# Patient Record
Sex: MALE | Race: WHITE | NOT HISPANIC OR LATINO | Employment: OTHER | URBAN - METROPOLITAN AREA
[De-identification: names, ages, dates, MRNs, and addresses within clinical notes are randomized per-mention and may not be internally consistent; named-entity substitution may affect disease eponyms.]

---

## 2017-02-16 ENCOUNTER — OFFICE VISIT (OUTPATIENT)
Dept: FAMILY MEDICINE | Facility: CLINIC | Age: 62
End: 2017-02-16
Payer: COMMERCIAL

## 2017-02-16 VITALS
SYSTOLIC BLOOD PRESSURE: 128 MMHG | BODY MASS INDEX: 34.4 KG/M2 | TEMPERATURE: 98.3 F | RESPIRATION RATE: 16 BRPM | HEIGHT: 68 IN | OXYGEN SATURATION: 97 % | WEIGHT: 227 LBS | HEART RATE: 61 BPM | DIASTOLIC BLOOD PRESSURE: 80 MMHG

## 2017-02-16 DIAGNOSIS — Z23 NEED FOR PROPHYLACTIC VACCINATION AND INOCULATION AGAINST INFLUENZA: ICD-10-CM

## 2017-02-16 DIAGNOSIS — E78.5 HYPERLIPIDEMIA LDL GOAL <130: ICD-10-CM

## 2017-02-16 DIAGNOSIS — R73.09 ELEVATED GLUCOSE: ICD-10-CM

## 2017-02-16 DIAGNOSIS — Z11.59 NEED FOR HEPATITIS C SCREENING TEST: ICD-10-CM

## 2017-02-16 DIAGNOSIS — Z00.00 ROUTINE GENERAL MEDICAL EXAMINATION AT A HEALTH CARE FACILITY: Primary | ICD-10-CM

## 2017-02-16 DIAGNOSIS — I10 HYPERTENSION GOAL BP (BLOOD PRESSURE) < 140/90: ICD-10-CM

## 2017-02-16 DIAGNOSIS — E03.9 ACQUIRED HYPOTHYROIDISM: ICD-10-CM

## 2017-02-16 DIAGNOSIS — E66.01 MORBID OBESITY DUE TO EXCESS CALORIES (H): ICD-10-CM

## 2017-02-16 LAB
HBA1C MFR BLD: 5 % (ref 4.3–6)
HGB BLD-MCNC: 15.1 G/DL (ref 13.3–17.7)

## 2017-02-16 PROCEDURE — 83036 HEMOGLOBIN GLYCOSYLATED A1C: CPT | Performed by: INTERNAL MEDICINE

## 2017-02-16 PROCEDURE — 80048 BASIC METABOLIC PNL TOTAL CA: CPT | Performed by: INTERNAL MEDICINE

## 2017-02-16 PROCEDURE — 86803 HEPATITIS C AB TEST: CPT | Performed by: INTERNAL MEDICINE

## 2017-02-16 PROCEDURE — 99396 PREV VISIT EST AGE 40-64: CPT | Performed by: INTERNAL MEDICINE

## 2017-02-16 PROCEDURE — 80061 LIPID PANEL: CPT | Performed by: INTERNAL MEDICINE

## 2017-02-16 PROCEDURE — 84443 ASSAY THYROID STIM HORMONE: CPT | Performed by: INTERNAL MEDICINE

## 2017-02-16 PROCEDURE — 85018 HEMOGLOBIN: CPT | Performed by: INTERNAL MEDICINE

## 2017-02-16 PROCEDURE — 36415 COLL VENOUS BLD VENIPUNCTURE: CPT | Performed by: INTERNAL MEDICINE

## 2017-02-16 RX ORDER — METOPROLOL TARTRATE 100 MG
TABLET ORAL
Qty: 90 TABLET | Refills: 3 | Status: SHIPPED | OUTPATIENT
Start: 2017-02-16 | End: 2018-02-23

## 2017-02-16 RX ORDER — LEVOTHYROXINE SODIUM 75 UG/1
75 TABLET ORAL DAILY
Qty: 90 TABLET | Refills: 3 | Status: SHIPPED | OUTPATIENT
Start: 2017-02-16 | End: 2018-02-23

## 2017-02-16 RX ORDER — PRAVASTATIN SODIUM 10 MG
TABLET ORAL
Qty: 48 TABLET | Refills: 3 | Status: SHIPPED | OUTPATIENT
Start: 2017-02-16 | End: 2017-02-27

## 2017-02-16 ASSESSMENT — PAIN SCALES - GENERAL: PAINLEVEL: NO PAIN (0)

## 2017-02-16 NOTE — LETTER
"                                                         [facility]  [address] [place] [address]. [place] [address]    March 2, 2017    Sonny Armstrong  [address] [facility] [place] [address]          Dear Sonny,  We have been trying to reach you and have been unsuccessful.  Indeed, Mr. Sonny Armstrong has a truly elevated low density lipoprotein, \" the bad cholesterol \" way out of the ideal range and I agree with an increased Statins (or HMG-CoA reductase inhibitor) dosing.    Lets increase dose to 20 milligrams 4 days per week. Recheck at some point down the road , 6 months is recommended .    He can double up on the current 10 milligram tabs and then when we refill his  Medication it can be with the new 20 milligram dose.    Results for orders placed or performed in visit on 02/16/17   Hepatitis C Screen Reflex to HCV RNA Quant and Genotype   Result Value Ref Range    Hepatitis C Antibody  NR     Nonreactive   Assay performance characteristics have not been established for newborns,   infants, and children     Lipid panel reflex to direct LDL   Result Value Ref Range    Cholesterol 271 (H) <200 mg/dL    Triglycerides 109 <150 mg/dL    HDL Cholesterol 59 >39 mg/dL    LDL Cholesterol Calculated 190 (H) <100 mg/dL    Non HDL Cholesterol 212 (H) <130 mg/dL   Hemoglobin A1c   Result Value Ref Range    Hemoglobin A1C 5.0 4.3 - 6.0 %   Hemoglobin   Result Value Ref Range    Hemoglobin 15.1 13.3 - 17.7 g/dL   TSH with free T4 reflex   Result Value Ref Range    TSH 3.53 0.40 - 4.00 mU/L   Basic metabolic panel   Result Value Ref Range    Sodium 142 133 - 144 mmol/L    Potassium 4.5 3.4 - 5.3 mmol/L    Chloride 106 94 - 109 mmol/L    Carbon Dioxide 28 20 - 32 mmol/L    Anion Gap 8 3 - 14 mmol/L    Glucose 90 70 - 99 mg/dL    Urea Nitrogen 25 7 - 30 mg/dL    Creatinine 0.92 0.66 - 1.25 mg/dL    GFR Estimate 84 >60 mL/min/1.7m2    GFR Estimate If Black >90   GFR Calc   >60 " mL/min/1.7m2    Calcium 9.0 8.5 - 10.1 mg/dL       If you have any questions or concerns, please me or my clinic team at [phone].      Sincerely,        Moreno Padilla MD/bt

## 2017-02-16 NOTE — PATIENT INSTRUCTIONS
Preventive Health Recommendations  Male Ages 50   64    Yearly exam:             See your health care provider every year in order to  o   Review health changes.   o   Discuss preventive care.    o   Review your medicines if your doctor has prescribed any.     Have a cholesterol test every 5 years, or more frequently if you are at risk for high cholesterol/heart disease.     Have a diabetes test (fasting glucose) every three years. If you are at risk for diabetes, you should have this test more often.     Have a colonoscopy at age 50, or have a yearly FIT test (stool test). These exams will check for colon cancer.      Talk with your health care provider about whether or not a prostate cancer screening test (PSA) is right for you.    You should be tested each year for STDs (sexually transmitted diseases), if you re at risk.     Shots: Get a flu shot each year. Get a tetanus shot every 10 years.     Nutrition:    Eat at least 5 servings of fruits and vegetables daily.     Eat whole-grain bread, whole-wheat pasta and brown rice instead of white grains and rice.     Talk to your provider about Calcium and Vitamin D.     Lifestyle    Exercise for at least 150 minutes a week (30 minutes a day, 5 days a week). This will help you control your weight and prevent disease.     Limit alcohol to one drink per day.     No smoking.     Wear sunscreen to prevent skin cancer.     See your dentist every six months for an exam and cleaning.     See your eye doctor every 1 to 2 years.  Recommended to have colonoscopy by around November / October 2017. Please let me know when to schedule you, please call the office then.

## 2017-02-16 NOTE — MR AVS SNAPSHOT
After Visit Summary   2/16/2017    Sonny Armstrong    MRN: [id number]           Patient Information     Date Of Birth          1955        Visit Information        Provider Department      2/16/2017 4:30 PM Moreno Padilla MD [facility]        Today's Diagnoses     Routine general medical examination at a health care facility    -  1    Hypertension goal BP (blood pressure) < 140/90        Hyperlipidemia LDL goal <130        Need for hepatitis C screening test        Need for prophylactic vaccination and inoculation against influenza        Elevated glucose        Acquired hypothyroidism        Morbid obesity due to excess calories (H)          Care Instructions      Preventive Health Recommendations  Male Ages 50 - 64    Yearly exam:             See your health care provider every year in order to  o   Review health changes.   o   Discuss preventive care.    o   Review your medicines if your doctor has prescribed any.     Have a cholesterol test every 5 years, or more frequently if you are at risk for high cholesterol/heart disease.     Have a diabetes test (fasting glucose) every three years. If you are at risk for diabetes, you should have this test more often.     Have a colonoscopy at age 50, or have a yearly FIT test (stool test). These exams will check for colon cancer.      Talk with your health care provider about whether or not a prostate cancer screening test (PSA) is right for you.    You should be tested each year for STDs (sexually transmitted diseases), if you re at risk.     Shots: Get a flu shot each year. Get a tetanus shot every 10 years.     Nutrition:    Eat at least 5 servings of fruits and vegetables daily.     Eat whole-grain bread, whole-wheat pasta and brown rice instead of white grains and rice.     Talk to your provider about Calcium and Vitamin D.     Lifestyle    Exercise for at least 150 minutes a week (30 minutes a day, 5 days a week). This will help  "you control your weight and prevent disease.     Limit alcohol to one drink per day.     No smoking.     Wear sunscreen to prevent skin cancer.     See your dentist every six months for an exam and cleaning.     See your eye doctor every 1 to 2 years.  Recommended to have colonoscopy by around November / October 2017. Please let me know when to schedule you, please call the office then.        Follow-ups after your visit        Who to contact     If you have questions or need follow up information about today's clinic visit or your schedule please contact [facility] directly at [phone].  Normal or non-critical lab and imaging results will be communicated to you by VersionOnehart, letter or phone within 4 business days after the clinic has received the results. If you do not hear from us within 7 days, please contact the clinic through [employer] or phone. If you have a critical or abnormal lab result, we will notify you by phone as soon as possible.  Submit refill requests through IntelePeer or call your pharmacy and they will forward the refill request to us. Please allow 3 business days for your refill to be completed.          Additional Information About Your Visit        MyChart Information     IntelePeer gives you secure access to your electronic health record. If you see a primary care provider, you can also send messages to your care team and make appointments. If you have questions, please call your primary care clinic.  If you do not have a primary care provider, please call [phone] and they will assist you.        Care EveryWhere ID     This is your Care EveryWhere ID. This could be used by other organizations to access your [facility] medical records  [id number]        Your Vitals Were     Pulse Temperature Respirations Height Pulse Oximetry BMI (Body Mass Index)    61 98.3  F (36.8  C) (Oral) 16 5' 8\" (1.727 m) 97% 34.52 kg/m2       Blood Pressure from Last 3 Encounters:   02/16/17 128/80 "   08/04/15 132/85   08/21/14 130/80    Weight from Last 3 Encounters:   02/16/17 227 lb (103 kg)   08/04/15 220 lb (99.8 kg)   08/21/14 205 lb (93 kg)              We Performed the Following     Basic metabolic panel     Hemoglobin A1c     Hemoglobin     Hepatitis C Screen Reflex to HCV RNA Quant and Genotype     Lipid panel reflex to direct LDL     TSH with free T4 reflex          Today's Medication Changes          These changes are accurate as of: 2/16/17 10:51 PM.  If you have any questions, ask your nurse or doctor.               These medicines have changed or have updated prescriptions.        Dose/Directions    levothyroxine 75 MCG tablet   Commonly known as:  SYNTHROID/LEVOTHROID   This may have changed:  See the new instructions.   Used for:  Acquired hypothyroidism   Changed by:  Moreno Padilla MD        Dose:  75 mcg   Take 1 tablet (75 mcg) by mouth daily   Quantity:  90 tablet   Refills:  3       metoprolol 100 MG tablet   Commonly known as:  LOPRESSOR   This may have changed:  See the new instructions.   Used for:  Hypertension goal BP (blood pressure) < 140/90   Changed by:  Moreno Padilla MD        TAKE ONE-HALF BY MOUTH TWICE DAILY   Quantity:  90 tablet   Refills:  3         Stop taking these medicines if you haven't already. Please contact your care team if you have questions.     STATIN NOT PRESCRIBED (INTENTIONAL)   Stopped by:  Moreno Padilla MD                Where to get your medicines      These medications were sent to [facility] [address] [address] AT [facility] [address] [facility] [address]     Phone:  [phone]     levothyroxine 75 MCG tablet    metoprolol 100 MG tablet    pravastatin 10 MG tablet                Primary Care Provider Office Phone # Fax #    Moreno Padilla [phone]       [facility] [address] [facility] [address]        Thank you!     Thank you for choosing [facility]  for your care. Our goal is always to provide you with excellent care. Hearing back from our patients is one way we can continue to improve our services. Please take a few minutes to complete the written survey that you may receive in the mail after your visit with us. Thank you!             Your Updated Medication List - Protect others around you: Learn how to safely use, store and throw away your medicines at www.disposemymeds.org.          This list is accurate as of: 2/16/17 10:51 PM.  Always use your most recent med list.                   Brand Name Dispense Instructions for use    aspirin 81 MG tablet      Take 1 tablet by mouth daily.       fish oil-omega-3 fatty acids 1000 MG capsule      Take 3 capsules by mouth daily.       levothyroxine 75 MCG tablet    SYNTHROID/LEVOTHROID    90 tablet    Take 1 tablet (75 mcg) by mouth daily       metoprolol 100 MG tablet    LOPRESSOR    90 tablet    TAKE ONE-HALF BY MOUTH TWICE DAILY       olopatadine 0.1 % ophthalmic solution    PATANOL    5 mL    Place 1 drop into both eyes 2 times daily       pravastatin 10 MG tablet    PRAVACHOL    48 tablet    Take 1 tab (10mg) 4 days per week

## 2017-02-16 NOTE — NURSING NOTE
"Chief Complaint   Patient presents with     Physical       Initial /80 (BP Location: Left arm, Patient Position: Chair, Cuff Size: Adult Regular)  Pulse 61  Temp 98.3  F (36.8  C) (Oral)  Resp 16  Ht 5' 8\" (1.727 m)  Wt 227 lb (103 kg)  SpO2 97%  BMI 34.52 kg/m2 Estimated body mass index is 34.52 kg/(m^2) as calculated from the following:    Height as of this encounter: 5' 8\" (1.727 m).    Weight as of this encounter: 227 lb (103 kg).  Medication Reconciliation: complete   Karie Avila CMA      "

## 2017-02-16 NOTE — PROGRESS NOTES
SUBJECTIVE:     CC: Sonny Armstrong is an 61 year old male who presents for preventative health visit.        Routine general medical examination at a health care facility  Hypertension goal BP (blood pressure) < 140/90  Hyperlipidemia LDL goal <130  Need for hepatitis C screening test  Need for prophylactic vaccination and inoculation against influenza  Elevated glucose  Acquired hypothyroidism  Morbid obesity due to excess calories (H)     Healthy Habits:    Do you get at least three servings of calcium containing foods daily (dairy, green leafy vegetables, etc.)? yes    Amount of exercise or daily activities, outside of work: No; will start again soon.    Problems taking medications regularly No    Medication side effects: No    Have you had an eye exam in the past two years? yes    Do you see a dentist twice per year? yes    Do you have sleep apnea, excessive snoring or daytime drowsiness? no    He ran out of the medication for hypercholesterolemia 1 month ago, otherwise doing ok. So he wants me to accept his fasting lipid panel numbers whatever they are, he agrees to get back on treatment with his statin medication   Wt Readings from Last 5 Encounters:   02/16/17 227 lb (103 kg)   08/04/15 220 lb (99.8 kg)   08/21/14 205 lb (93 kg)   11/14/13 212 lb 6.4 oz (96.3 kg)   08/19/13 215 lb 12.8 oz (97.9 kg)     Body mass index is 34.52 kg/(m^2).      Today's PHQ-2 Score:   PHQ-2 ( 1999 Pfizer) 2/16/2017 8/4/2015   Q1: Little interest or pleasure in doing things 0 0   Q2: Feeling down, depressed or hopeless 0 0   PHQ-2 Score 0 0       Abuse: Current or Past(Physical, Sexual or Emotional)- No  Do you feel safe in your environment - Yes    Social History   Substance Use Topics     Smoking status: Never Smoker     Smokeless tobacco: Never Used     Alcohol use No     The patient does not drink >3 drinks per day nor >7 drinks per week.    Last PSA:   PSA   Date Value Ref Range Status   06/06/2012 0.88 0 - 4 ug/L Final        Recent Labs   Lab Test  08/04/15   1502  08/21/14   1457   CHOL  216*  222*   HDL  50  61   LDL  141*  140*   TRIG  125  103   CHOLHDLRATIO  4.3  3.6     BP Readings from Last 3 Encounters:   02/16/17 128/80   08/04/15 132/85   08/21/14 130/80         Reviewed orders with patient. Reviewed health maintenance and updated orders accordingly - Yes    All Histories reviewed and updated in Epic.      ROS:  C: NEGATIVE for fever, chills, change in weight  I: NEGATIVE for worrisome rashes, moles or lesions  E: NEGATIVE for vision changes or irritation  ENT: NEGATIVE for ear, mouth and throat problems  R: NEGATIVE for significant cough or SOB  CV: NEGATIVE for chest pain, palpitations or peripheral edema  GI: NEGATIVE for nausea, abdominal pain, heartburn, or change in bowel habits   male: negative for dysuria, hematuria, decreased urinary stream, erectile dysfunction, urethral discharge  M: NEGATIVE for significant arthralgias or myalgia  N: NEGATIVE for weakness, dizziness or paresthesias  P: NEGATIVE for changes in mood or affect    Problem list, Medication list, Allergies, and Medical/Social/Surgical histories reviewed in EPIC and updated as appropriate.  Labs reviewed in EPIC  BP Readings from Last 3 Encounters:   02/16/17 128/80   08/04/15 132/85   08/21/14 130/80    Wt Readings from Last 3 Encounters:   02/16/17 227 lb (103 kg)   08/04/15 220 lb (99.8 kg)   08/21/14 205 lb (93 kg)                  Patient Active Problem List   Diagnosis     Hyperlipidemia LDL goal <130     Hypothyroidism     Hypertension goal BP (blood pressure) < 140/90     BURDICK (nonalcoholic steatohepatitis)     Obesity     Elevated glucose     Chronic fatigue     Advanced directives, counseling/discussion     Hx of LASIK, ou     Anterior corneal dystrophy, ou     Past Surgical History   Procedure Laterality Date     Tonsillectomy  1971       Social History   Substance Use Topics     Smoking status: Never Smoker     Smokeless tobacco: Never  "Used     Alcohol use No     Family History   Problem Relation Age of Onset     HEART DISEASE Father      DIABETES Maternal Grandfather      Thyroid Disease Daughter          Current Outpatient Prescriptions   Medication Sig Dispense Refill     metoprolol (LOPRESSOR) 100 MG tablet TAKE ONE-HALF BY MOUTH TWICE DAILY 90 tablet 0     levothyroxine (SYNTHROID, LEVOTHROID) 75 MCG tablet TAKE 1 TABLET BY MOUTH DAILY 90 tablet 0     pravastatin (PRAVACHOL) 10 MG tablet Take 1 tab (10mg) 4 days per week 48 tablet 3     olopatadine (PATANOL) 0.1 % ophthalmic solution Place 1 drop into both eyes 2 times daily 5 mL 1     aspirin 81 MG tablet Take 1 tablet by mouth daily.       fish oil-omega-3 fatty acids (FISH OIL) 1000 MG capsule Take 3 capsules by mouth daily.       No Known Allergies  Recent Labs   Lab Test  08/04/15   1502  08/21/14   1457  08/19/13   1505   A1C  5.4  4.9  5.0   LDL  141*  140*  175*   HDL  50  61  49   TRIG  125  103  113   ALT  48  45  51   CR  1.00  1.03  0.71   GFRESTIMATED  77  74  >90   GFRESTBLACK  >90   GFR Calc    90  >90   POTASSIUM  4.1  4.4  4.5   TSH  1.54  2.94  1.28      OBJECTIVE:     /80 (BP Location: Left arm, Patient Position: Chair, Cuff Size: Adult Regular)  Pulse 61  Temp 98.3  F (36.8  C) (Oral)  Resp 16  Ht 5' 8\" (1.727 m)  Wt 227 lb (103 kg)  SpO2 97%  BMI 34.52 kg/m2  EXAM:  GENERAL: healthy, alert and no distress, answers all questions appropriately, talkative and appropriate, normal grooming and affect , appears his stated age   EYES: Eyes grossly normal to inspection, PERRL and conjunctivae and sclerae normal  HENT: ear canals and TM's normal, nose and mouth without ulcers or lesions  NECK: no adenopathy, no asymmetry, masses, or scars and thyroid normal to palpation  RESP: lungs clear to auscultation - no rales, rhonchi or wheezes  CV: regular rate and rhythm, normal S1 S2, no S3 or S4, no murmur, click or rub, no peripheral edema and peripheral pulses " strong  ABDOMEN: soft, nontender, no hepatosplenomegaly, no masses and bowel sounds normal  MS: no gross musculoskeletal defects noted, no edema  SKIN: no suspicious lesions or rashes  NEURO: Normal strength and tone, mentation intact and speech normal  PSYCH: mentation appears normal, affect normal/bright    ASSESSMENT/PLAN:     (R73.09) Elevated glucose  (primary encounter diagnosis)  Comment: this patient has a a nonspecific trivial variation from the normal range with his blood glucose and this is a non-issue at this point , recheck in a year   Lab Results   Component Value Date    A1C 5.0 02/16/2017    A1C 5.4 08/04/2015    A1C 4.9 08/21/2014    A1C 5.0 08/19/2013    A1C 5.3 08/15/2012       Plan: as above     (E03.9) Hypothyroidism  Comment:   TSH   Date Value Ref Range Status   08/04/2015 1.54 0.40 - 4.00 mU/L Final   Plan: levothyroxine (SYNTHROID/LEVOTHROID) 75 MCG         tablet            (I10) Hypertension goal BP (blood pressure) < 140/90  Comment: controlled within acceptable limits , continue current plan of care  , check basic metabolic panel   Plan: metoprolol (LOPRESSOR) 100 MG tablet            (E78.5) Hyperlipidemia LDL goal <130  Comment: current fasting lipid panel numbers are still pending  Lab Results   Component Value Date    CHOL 216 08/04/2015     Lab Results   Component Value Date    HDL 50 08/04/2015     Lab Results   Component Value Date     08/04/2015     04/02/2010     Lab Results   Component Value Date    TRIG 125 08/04/2015     Lab Results   Component Value Date    CHOLHDLRATIO 4.3 08/04/2015       Plan: pravastatin (PRAVACHOL) 10 MG tablet            (Z11.59) Need for hepatitis C screening test  Comment: tested today   Plan: hepatitis C     (Z23) Need for prophylactic vaccination and inoculation against influenza  Comment: declines   Plan: see immunization section of Epic electronic medical records       COUNSELING:  Reviewed preventive health counseling, as reflected  "in patient instructions       Regular exercise       Healthy diet/nutrition       Vision screening       Hearing screening       Colon cancer screening       Prostate cancer screening       Advance Care Planning         reports that he has never smoked. He has never used smokeless tobacco.    Estimated body mass index is 34.52 kg/(m^2) as calculated from the following:    Height as of this encounter: 5' 8\" (1.727 m).    Weight as of this encounter: 227 lb (103 kg).   Weight management plan: Discussed healthy diet and exercise guidelines and patient will follow up in 12 months in clinic to re-evaluate.    Counseling Resources:  ATP IV Guidelines  Pooled Cohorts Equation Calculator  FRAX Risk Assessment  ICSI Preventive Guidelines  Dietary Guidelines for Americans, 2010  USDA's MyPlate  ASA Prophylaxis  Lung CA Screening    Moreno Padilla MD  [facility]  "

## 2017-02-17 LAB
ANION GAP SERPL CALCULATED.3IONS-SCNC: 8 MMOL/L (ref 3–14)
BUN SERPL-MCNC: 25 MG/DL (ref 7–30)
CALCIUM SERPL-MCNC: 9 MG/DL (ref 8.5–10.1)
CHLORIDE SERPL-SCNC: 106 MMOL/L (ref 94–109)
CO2 SERPL-SCNC: 28 MMOL/L (ref 20–32)
CREAT SERPL-MCNC: 0.92 MG/DL (ref 0.66–1.25)
GFR SERPL CREATININE-BSD FRML MDRD: 84 ML/MIN/1.7M2
GLUCOSE SERPL-MCNC: 90 MG/DL (ref 70–99)
HCV AB SERPL QL IA: NORMAL
POTASSIUM SERPL-SCNC: 4.5 MMOL/L (ref 3.4–5.3)
SODIUM SERPL-SCNC: 142 MMOL/L (ref 133–144)

## 2017-02-19 NOTE — PROGRESS NOTES
Dear Sonny,    Your recent test results are attached.      Normal kidney function and electrolytes.      If you have any questions please feel free to contact [phone] or myself via PitchEnginet.    Sincerely,  Osiris Bryant CNP ( Moreno Padilla MD is currently out of office )

## 2017-02-20 LAB
CHOLEST SERPL-MCNC: 271 MG/DL
HDLC SERPL-MCNC: 59 MG/DL
LDLC SERPL CALC-MCNC: 190 MG/DL
NONHDLC SERPL-MCNC: 212 MG/DL
TRIGL SERPL-MCNC: 109 MG/DL
TSH SERPL DL<=0.005 MIU/L-ACNC: 3.53 MU/L (ref 0.4–4)

## 2017-02-27 DIAGNOSIS — E78.5 HYPERLIPIDEMIA LDL GOAL <130: ICD-10-CM

## 2017-02-27 RX ORDER — PRAVASTATIN SODIUM 20 MG
TABLET ORAL
Qty: 48 TABLET | Refills: 1 | Status: SHIPPED | OUTPATIENT
Start: 2017-02-27 | End: 2018-02-23

## 2017-02-28 RX ORDER — PRAVASTATIN SODIUM 20 MG
TABLET ORAL
Qty: 51 TABLET | Refills: 1 | OUTPATIENT
Start: 2017-02-28

## 2017-02-28 NOTE — TELEPHONE ENCOUNTER
Duplicate. Called and verified with pharmacy.  Medication Detail         Disp Refills Start End MERRY     pravastatin (PRAVACHOL) 20 MG tablet 48 tablet 1 2/27/2017  No     Sig: Take 1 tab (20mg) 4 days per week     Class: E-Prescribe     Notes to Pharmacy: Can double up on 10mg tablets until supply is gone, then start 20mg tabs. Patient will call when needed     Order: [id number]     E-Prescribing Status: Receipt confirmed by pharmacy (2/27/2017  6:58 PM CST)       Printout Tracking      External Result Report       Medication Administration Instructions      Take 1 tab (20mg) 4 days per week       Pharmacy      [facility] [id number] [facility] [address] AT [facility] [address]         An NATALIE Hopson

## 2017-12-03 ENCOUNTER — HEALTH MAINTENANCE LETTER (OUTPATIENT)
Age: 62
End: 2017-12-03

## 2018-02-23 ENCOUNTER — RADIANT APPOINTMENT (OUTPATIENT)
Dept: GENERAL RADIOLOGY | Facility: CLINIC | Age: 63
End: 2018-02-23
Attending: INTERNAL MEDICINE
Payer: COMMERCIAL

## 2018-02-23 ENCOUNTER — OFFICE VISIT (OUTPATIENT)
Dept: FAMILY MEDICINE | Facility: CLINIC | Age: 63
End: 2018-02-23
Payer: COMMERCIAL

## 2018-02-23 VITALS
OXYGEN SATURATION: 95 % | BODY MASS INDEX: 34.37 KG/M2 | HEIGHT: 68 IN | HEART RATE: 70 BPM | RESPIRATION RATE: 13 BRPM | WEIGHT: 226.8 LBS | DIASTOLIC BLOOD PRESSURE: 86 MMHG | TEMPERATURE: 97.5 F | SYSTOLIC BLOOD PRESSURE: 130 MMHG

## 2018-02-23 DIAGNOSIS — M54.2 CERVICALGIA: ICD-10-CM

## 2018-02-23 DIAGNOSIS — Z12.11 SCREEN FOR COLON CANCER: ICD-10-CM

## 2018-02-23 DIAGNOSIS — R73.09 ELEVATED GLUCOSE: ICD-10-CM

## 2018-02-23 DIAGNOSIS — Z23 NEED FOR PROPHYLACTIC VACCINATION AND INOCULATION AGAINST INFLUENZA: ICD-10-CM

## 2018-02-23 DIAGNOSIS — M65.30 TRIGGER FINGER, ACQUIRED: ICD-10-CM

## 2018-02-23 DIAGNOSIS — E03.9 ACQUIRED HYPOTHYROIDISM: ICD-10-CM

## 2018-02-23 DIAGNOSIS — E78.5 HYPERLIPIDEMIA LDL GOAL <130: ICD-10-CM

## 2018-02-23 DIAGNOSIS — Z00.00 ROUTINE GENERAL MEDICAL EXAMINATION AT A HEALTH CARE FACILITY: Primary | ICD-10-CM

## 2018-02-23 DIAGNOSIS — I10 HYPERTENSION GOAL BP (BLOOD PRESSURE) < 140/90: ICD-10-CM

## 2018-02-23 DIAGNOSIS — H18.599 ANTERIOR CORNEAL DYSTROPHY: ICD-10-CM

## 2018-02-23 LAB
ANION GAP SERPL CALCULATED.3IONS-SCNC: 8 MMOL/L (ref 3–14)
BUN SERPL-MCNC: 16 MG/DL (ref 7–30)
CALCIUM SERPL-MCNC: 9.4 MG/DL (ref 8.5–10.1)
CHLORIDE SERPL-SCNC: 104 MMOL/L (ref 94–109)
CHOLEST SERPL-MCNC: 200 MG/DL
CO2 SERPL-SCNC: 25 MMOL/L (ref 20–32)
CREAT SERPL-MCNC: 0.94 MG/DL (ref 0.66–1.25)
GFR SERPL CREATININE-BSD FRML MDRD: 82 ML/MIN/1.7M2
GLUCOSE SERPL-MCNC: 101 MG/DL (ref 70–99)
HBA1C MFR BLD: 5.1 % (ref 4.3–6)
HDLC SERPL-MCNC: 57 MG/DL
HGB BLD-MCNC: 15.6 G/DL (ref 13.3–17.7)
LDLC SERPL CALC-MCNC: 123 MG/DL
NONHDLC SERPL-MCNC: 143 MG/DL
POTASSIUM SERPL-SCNC: 4.5 MMOL/L (ref 3.4–5.3)
SODIUM SERPL-SCNC: 137 MMOL/L (ref 133–144)
T4 FREE SERPL-MCNC: 1.1 NG/DL (ref 0.76–1.46)
TRIGL SERPL-MCNC: 100 MG/DL
TSH SERPL DL<=0.005 MIU/L-ACNC: 4.04 MU/L (ref 0.4–4)

## 2018-02-23 PROCEDURE — 84439 ASSAY OF FREE THYROXINE: CPT | Performed by: INTERNAL MEDICINE

## 2018-02-23 PROCEDURE — 80061 LIPID PANEL: CPT | Performed by: INTERNAL MEDICINE

## 2018-02-23 PROCEDURE — 85018 HEMOGLOBIN: CPT | Performed by: INTERNAL MEDICINE

## 2018-02-23 PROCEDURE — 83036 HEMOGLOBIN GLYCOSYLATED A1C: CPT | Performed by: INTERNAL MEDICINE

## 2018-02-23 PROCEDURE — 99213 OFFICE O/P EST LOW 20 MIN: CPT | Mod: 25 | Performed by: INTERNAL MEDICINE

## 2018-02-23 PROCEDURE — 36415 COLL VENOUS BLD VENIPUNCTURE: CPT | Performed by: INTERNAL MEDICINE

## 2018-02-23 PROCEDURE — 99396 PREV VISIT EST AGE 40-64: CPT | Performed by: INTERNAL MEDICINE

## 2018-02-23 PROCEDURE — 84443 ASSAY THYROID STIM HORMONE: CPT | Performed by: INTERNAL MEDICINE

## 2018-02-23 PROCEDURE — 80048 BASIC METABOLIC PNL TOTAL CA: CPT | Performed by: INTERNAL MEDICINE

## 2018-02-23 PROCEDURE — 72040 X-RAY EXAM NECK SPINE 2-3 VW: CPT

## 2018-02-23 RX ORDER — PRAVASTATIN SODIUM 20 MG
TABLET ORAL
Qty: 48 TABLET | Refills: 3 | Status: SHIPPED | OUTPATIENT
Start: 2018-02-23 | End: 2019-01-07

## 2018-02-23 RX ORDER — METOPROLOL TARTRATE 100 MG
TABLET ORAL
Qty: 90 TABLET | Refills: 3 | Status: SHIPPED | OUTPATIENT
Start: 2018-02-23 | End: 2018-10-23

## 2018-02-23 RX ORDER — LEVOTHYROXINE SODIUM 75 UG/1
75 TABLET ORAL DAILY
Qty: 90 TABLET | Refills: 3 | Status: SHIPPED | OUTPATIENT
Start: 2018-02-23 | End: 2019-01-07

## 2018-02-23 RX ORDER — METHYLPREDNISOLONE 4 MG
TABLET, DOSE PACK ORAL
Qty: 21 TABLET | Refills: 0 | Status: SHIPPED | OUTPATIENT
Start: 2018-02-23 | End: 2018-11-13

## 2018-02-23 NOTE — NURSING NOTE
"Chief Complaint   Patient presents with     Physical       Initial /86 (BP Location: Left arm, Patient Position: Chair, Cuff Size: Adult Regular)  Pulse 70  Temp 97.5  F (36.4  C) (Oral)  Resp 13  Ht 5' 8.25\" (1.734 m)  Wt 226 lb 12.8 oz (102.9 kg)  SpO2 95%  BMI 34.23 kg/m2 Estimated body mass index is 34.23 kg/(m^2) as calculated from the following:    Height as of this encounter: 5' 8.25\" (1.734 m).    Weight as of this encounter: 226 lb 12.8 oz (102.9 kg).  Medication Reconciliation: complete     Daniel Graf      "

## 2018-02-23 NOTE — MR AVS SNAPSHOT
After Visit Summary   2/23/2018    Sonny Armstrong    MRN: [id number]           Patient Information     Date Of Birth          1955        Visit Information        Provider Department      2/23/2018 2:10 PM Moreno Padilla MD [facility]        Today's Diagnoses     Routine general medical examination at a health care facility    -  1    Screen for colon cancer        Need for prophylactic vaccination and inoculation against influenza        Anterior corneal dystrophy, ou        Cervicalgia        Trigger finger, acquired        Hyperlipidemia LDL goal <130        Hypertension goal BP (blood pressure) < 140/90        Elevated glucose        Acquired hypothyroidism          Care Instructions    -- I will follow up with you about lab results.         [facility]    If you have any questions regarding to your visit please contact your care team:     Team Pink:   Clinic Hours Telephone Number   Internal Medicine:  Dr. Claudia Bryant, NP       7am-7pm  Monday - Thursday   7am-5pm  Fridays  [phone]  (Appointment scheduling available 24/7)    Questions about your visit?  Team Line  [phone]   Urgent Care - [place] - 11am-9pm Monday-Friday Saturday-Sunday- 9am-5pm   [place] - 5pm-9pm Monday-Friday Saturday-Sunday- 9am-5pm  [phone] - [place] ABBOTT  [phone] - [place]       What options do I have for visits at the clinic other than the traditional office visit?  To expand how we care for you, many of our providers are utilizing electronic visits (e-visits) and telephone visits, when medically appropriate, for interactions with their patients rather than a visit in the clinic.   We also offer nurse visits for many medical concerns. Just like any other service, we will bill your insurance company for this type of visit based on time spent on the phone with your provider. Not all insurance  companies cover these visits. Please check with your medical insurance if this type of visit is covered. You will be responsible for any charges that are not paid by your insurance.      E-visits via eCozyhart:  generally incur a $35.00 fee.  Telephone visits:  Time spent on the phone: *charged based on time that is spent on the phone in increments of 10 minutes. Estimated cost:   5-10 mins $30.00   11-20 mins. $59.00   21-30 mins. $85.00   Use Meal Sharing (secure email communication and access to your chart) to send your primary care provider a message or make an appointment. Ask someone on your Team how to sign up for Meal Sharing.    For a Price Quote for your services, please call our Speed Dating by [employer] Line at [phone].    As always, Thank you for trusting us with your health care needs!    Cathleen CASAREZ CMA ([facility])            Follow-ups after your visit        Additional Services     GASTROENTEROLOGY ADULT REF PROCEDURE ONLY       Last Lab Result: Creatinine (mg/dL)       Date                     Value                 02/16/2017               0.92             ----------  Body mass index is 34.23 kg/(m^2).     Needed:  No  Language:  English    Patient will be contacted to schedule procedure.     Please be aware that coverage of these services is subject to the terms and limitations of your health insurance plan.  Call member services at your health plan with any benefit or coverage questions.  Any procedures must be performed at a [facility] facility OR coordinated by your clinic's referral office.    Please bring the following with you to your appointment:    (1) Any X-Rays, CTs or MRIs which have been performed.  Contact the facility where they were done to arrange for  prior to your scheduled appointment.    (2) List of current medications   (3) This referral request   (4) Any documents/labs given to you for this referral            [facility] PT, HAND, AND CHIROPRACTIC REFERRAL       **This order will print in  the [facility] Scheduling Office**    Physical Therapy, Hand Therapy and Chiropractic Care are available through:    *[facility]  *[facility]  *[facility]    Call one number to schedule at any of the above locations: [phone].    Your provider has referred you to: Physical Therapy at [facility] or [facility]    Indication/Reason for Referral: Neck Pain  Onset of Illness: approximately 3 weeks  Therapy Orders: Evaluate and Treat  Special Programs: None  Special Request: None    Alpesh Betts      Additional Comments for the Therapist or Chiropractor: see office visit notes     Please be aware that coverage of these services is subject to the terms and limitations of your health insurance plan.  Call member services at your health plan with any benefit or coverage questions.      Please bring the following to your appointment:    *Your personal calendar for scheduling future appointments  *[facility]            ORTHOPEDICS ADULT REFERRAL       Your provider has referred you to: [facility]: [facility] [place] [phone]    [email or web address]    Please be aware that coverage of these services is subject to the terms and limitations of your health insurance plan.  Call member services at your health plan with any benefit or coverage questions.      Please bring the following to your appointment:    >>   Any x-rays, CTs or MRIs which have been performed.  Contact the facility where they were done to arrange for  prior to your scheduled appointment.    >>   List of current medications   >>   This referral request   >>   Any documents/labs given to you for this referral                  Future tests that were ordered for you today     Open Future Orders        Priority Expected Expires Ordered    XR Cervical Spine 2/3 Views Routine 2/23/2018 2/23/2019 2/23/2018            Who to contact     If you have questions or need follow up  "information about today's clinic visit or your schedule please contact [facility] directly at [phone].  Normal or non-critical lab and imaging results will be communicated to you by [employer], letter or phone within 4 business days after the clinic has received the results. If you do not hear from us within 7 days, please contact the clinic through [employer] or phone. If you have a critical or abnormal lab result, we will notify you by phone as soon as possible.  Submit refill requests through Groopt or call your pharmacy and they will forward the refill request to us. Please allow 3 business days for your refill to be completed.          Additional Information About Your Visit        [employer] Information     Groopt gives you secure access to your electronic health record. If you see a primary care provider, you can also send messages to your care team and make appointments. If you have questions, please call your primary care clinic.  If you do not have a primary care provider, please call [phone] and they will assist you.        Care EveryWhere ID     This is your Care EveryWhere ID. This could be used by other organizations to access your [facility] medical records  [id number]        Your Vitals Were     Pulse Temperature Respirations Height Pulse Oximetry BMI (Body Mass Index)    70 97.5  F (36.4  C) (Oral) 13 5' 8.25\" (1.734 m) 95% 34.23 kg/m2       Blood Pressure from Last 3 Encounters:   02/23/18 130/86   02/16/17 128/80   08/04/15 132/85    Weight from Last 3 Encounters:   02/23/18 226 lb 12.8 oz (102.9 kg)   02/16/17 227 lb (103 kg)   08/04/15 220 lb (99.8 kg)              We Performed the Following     Basic metabolic panel     GASTROENTEROLOGY ADULT REF PROCEDURE ONLY     Hemoglobin A1c     Hemoglobin     [facility] PT, HAND, AND CHIROPRACTIC REFERRAL     Lipid panel reflex to direct LDL Fasting     ORTHOPEDICS ADULT REFERRAL     TSH WITH FREE T4 REFLEX          Today's Medication Changes    "       These changes are accurate as of 2/23/18  2:19 PM.  If you have any questions, ask your nurse or doctor.               Start taking these medicines.        Dose/Directions    methylPREDNISolone 4 MG tablet   Commonly known as:  MEDROL DOSEPAK   Used for:  Cervicalgia   Started by:  Moreno Padilla MD        Follow package instructions   Quantity:  21 tablet   Refills:  0            Where to get your medicines      These medications were sent to [facility] [id number] [place], [place] [address] [place] AT [place] [address], [facility] [address]     Phone:  [phone]     levothyroxine 75 MCG tablet    metoprolol tartrate 100 MG tablet    pravastatin 20 MG tablet         Some of these will need a paper prescription and others can be bought over the counter.  Ask your nurse if you have questions.     Bring a paper prescription for each of these medications     methylPREDNISolone 4 MG tablet                Primary Care Provider Office Phone # Fax #    Moreno Padilla [phone] [address] [facility] [address]        Equal Access to Services     [facility] AH: Hadii maurice freedman hadasho Soomaali, waaxda luqadaha, qaybta kaalmada adeegyanavarro, miranda garcia . So [facility] [phone].    ATENCIÓN: Si habla español, tiene a velez disposición servicios gratuitos de asistencia lingüística. Chiame al [phone].    We comply with applicable federal civil rights laws and [place] laws. We do not discriminate on the basis of race, color, national origin, age, disability, sex, sexual orientation, or gender identity.            Thank you!     Thank you for choosing [facility]  for your care. Our goal is always to provide you with excellent care. Hearing back from our patients is one way we can continue to improve our services. Please take a few minutes to complete the written survey that you may receive in the mail after your visit  with us. Thank you!             Your Updated Medication List - Protect others around you: Learn how to safely use, store and throw away your medicines at www.disposemymeds.org.          This list is accurate as of 2/23/18  2:19 PM.  Always use your most recent med list.                   Brand Name Dispense Instructions for use Diagnosis    aspirin 81 MG tablet      Take 1 tablet by mouth daily.        fish oil-omega-3 fatty acids 1000 MG capsule      Take 3 capsules by mouth daily.        levothyroxine 75 MCG tablet    SYNTHROID/LEVOTHROID    90 tablet    Take 1 tablet (75 mcg) by mouth daily    Acquired hypothyroidism       methylPREDNISolone 4 MG tablet    MEDROL DOSEPAK    21 tablet    Follow package instructions    Cervicalgia       metoprolol tartrate 100 MG tablet    LOPRESSOR    90 tablet    TAKE ONE-HALF BY MOUTH TWICE DAILY    Hypertension goal BP (blood pressure) < 140/90       olopatadine 0.1 % ophthalmic solution    PATANOL    5 mL    Place 1 drop into both eyes 2 times daily    Itchy eyes       pravastatin 20 MG tablet    PRAVACHOL    48 tablet    Take 1 tab (20mg) 4 days per week    Hyperlipidemia LDL goal <130

## 2018-02-23 NOTE — LETTER
[facility] [place] [facility]  [address] [place] [address]    February 26, 2018    Sonny ANTOINE Patti  [address] [address]          Dear Kehinde Dennis is a copy of your results. All of these tests are within acceptable limits. Things look good ! Please keep doing what you are doing.       Results for orders placed or performed in visit on 02/23/18   TSH WITH FREE T4 REFLEX   Result Value Ref Range    TSH 4.04 (H) 0.40 - 4.00 mU/L   Lipid panel reflex to direct LDL Fasting   Result Value Ref Range    Cholesterol 200 (H) <200 mg/dL    Triglycerides 100 <150 mg/dL    HDL Cholesterol 57 >39 mg/dL    LDL Cholesterol Calculated 123 (H) <100 mg/dL    Non HDL Cholesterol 143 (H) <130 mg/dL   Basic metabolic panel   Result Value Ref Range    Sodium 137 133 - 144 mmol/L    Potassium 4.5 3.4 - 5.3 mmol/L    Chloride 104 94 - 109 mmol/L    Carbon Dioxide 25 20 - 32 mmol/L    Anion Gap 8 3 - 14 mmol/L    Glucose 101 (H) 70 - 99 mg/dL    Urea Nitrogen 16 7 - 30 mg/dL    Creatinine 0.94 0.66 - 1.25 mg/dL    GFR Estimate 82 >60 mL/min/1.7m2    GFR Estimate If Black >90 >60 mL/min/1.7m2    Calcium 9.4 8.5 - 10.1 mg/dL   Hemoglobin   Result Value Ref Range    Hemoglobin 15.6 13.3 - 17.7 g/dL   Hemoglobin A1c   Result Value Ref Range    Hemoglobin A1C 5.1 4.3 - 6.0 %   T4 free   Result Value Ref Range    T4 Free 1.10 0.76 - 1.46 ng/dL     If you have any questions or concerns, please call myself or my nurse at [phone].    Sincerely,        Moreno Padilla MD/junie

## 2018-02-23 NOTE — PATIENT INSTRUCTIONS
-- I will follow up with you about lab results.         [facility]    If you have any questions regarding to your visit please contact your care team:     Team Pink:   Clinic Hours Telephone Number   Internal Medicine:  Dr. Claudia Bryant, NP       7am-7pm  Monday - Thursday   7am-5pm  Fridays  [phone]  (Appointment scheduling available 24/7)    Questions about your visit?  Team Line  [phone]   [facility] - [place] [facility] - 11am-9pm Monday-Friday Saturday-Sunday- 9am-5pm   [place] - 5pm-9pm Monday-Friday Saturday-Sunday- 9am-5pm  [phone] - [place]   [phone] - [place]       What options do I have for visits at the clinic other than the traditional office visit?  To expand how we care for you, many of our providers are utilizing electronic visits (e-visits) and telephone visits, when medically appropriate, for interactions with their patients rather than a visit in the clinic.   We also offer nurse visits for many medical concerns. Just like any other service, we will bill your insurance company for this type of visit based on time spent on the phone with your provider. Not all insurance companies cover these visits. Please check with your medical insurance if this type of visit is covered. You will be responsible for any charges that are not paid by your insurance.      E-visits via makemoji:  generally incur a $35.00 fee.  Telephone visits:  Time spent on the phone: *charged based on time that is spent on the phone in increments of 10 minutes. Estimated cost:   5-10 mins $30.00   11-20 mins. $59.00   21-30 mins. $85.00   Use webmet (secure email communication and access to your chart) to send your primary care provider a message or make an appointment. Ask someone on your Team how to sign up for makemoji.    For a Price Quote for your services, please call our Consumer Price Line at [phone].    As always, Thank  you for trusting us with your health care needs!    Cathleen CASAREZ CMA ([facility])

## 2018-02-23 NOTE — PROGRESS NOTES
SUBJECTIVE:   CC: Sonny Armstrong is an 62 year old male who presents for preventative health visit.     Physical   Annual:     Getting at least 3 servings of Calcium per day::  Yes    Bi-annual eye exam::  Yes    Dental care twice a year::  Yes    Sleep apnea or symptoms of sleep apnea::  None    Diet::  Regular (no restrictions)    Frequency of exercise::  2-3 days/week    Duration of exercise::  30-45 minutes    Taking medications regularly::  Yes    Medication side effects::  Muscle aches    Additional concerns today::  No            Muscoloskeletal-- Patient states that his right 4th finger has been getting locked if he makes a fist. He states that his finger was previously popping most of the time, but he has not had any recent popping.  He notes that he was shoveling snow about 3 weeks ago and he felt pain in his neck. He took an ibuprofen which improved his pain, but he has been having recurrent pain in his neck anytime he bends his neck. He usually takes an ibuprofen and his symptoms are resolved. He denies any tingling sensation in his arms.     Colonoscopy-- Patient is due for the procedure. He states that he would like to wait until the end of the winter before getting the procedure done.     Additional Information-- Patient denies any bladder or bowel issues.     Hypertension--  BP Readings from Last 6 Encounters:   02/23/18 130/86   02/16/17 128/80   08/04/15 132/85   08/21/14 130/80   11/14/13 124/76   08/19/13 124/74        Today's PHQ-2 Score:   PHQ-2 ( 1999 Pf[employer]) 2/23/2018   Q1: Little interest or pleasure in doing things 0   Q2: Feeling down, depressed or hopeless 0   PHQ-2 Score 0   Q1: Little interest or pleasure in doing things Not at all   Q2: Feeling down, depressed or hopeless Not at all   PHQ-2 Score 0       Abuse: Current or Past(Physical, Sexual or Emotional)- No  Do you feel safe in your environment - Yes    Social History   Substance Use Topics     Smoking status: Never Smoker      "Smokeless tobacco: Never Used     Alcohol use No     Alcohol Use 2/23/2018   If you drink alcohol, do you typically have greater than 3 drinks per day OR greater than 7 drinks per week?   No       Last PSA:   PSA   Date Value Ref Range Status   06/06/2012 0.88 0 - 4 ug/L Final       Reviewed orders with patient. Reviewed health maintenance and updated orders accordingly - Yes  Labs reviewed in EPIC    Reviewed and updated as needed this visit by clinical staff  Tobacco  Allergies  Meds  Med Hx  Surg Hx  Fam Hx  Soc Hx        Reviewed and updated as needed this visit by Provider        Past Medical History:   Diagnosis Date     Hyperlipidemia LDL goal <130 11/16/2010     Hypertension goal BP (blood pressure) < 140/90 11/16/2010     Hypothyroidism 11/16/2010     BURDICK (nonalcoholic steatohepatitis) 2007    had liver biopsy      Past Surgical History:   Procedure Laterality Date     TONSILLECTOMY  1971       Review of Systems  Constitutional, HEENT, cardiovascular, pulmonary, GI, , musculoskeletal, neuro, skin, endocrine and psych systems are negative, except as otherwise noted.    This document serves as a record of the services and decisions personally performed and made by Moreno Padilla MD. It was created on their behalf by Maximo Tejeda, a trained medical scribe. The creation of this document is based the provider's statements to the medical scribe.  Maximo Tejeda  February 23, 2018 1:58 PM   OBJECTIVE:   /86 (BP Location: Left arm, Patient Position: Chair, Cuff Size: Adult Regular)  Pulse 70  Temp 97.5  F (36.4  C) (Oral)  Resp 13  Ht 1.734 m (5' 8.25\")  Wt 102.9 kg (226 lb 12.8 oz)  SpO2 95%  BMI 34.23 kg/m2    Physical Exam  GENERAL: healthy, alert and no distress, overweight , talkative and appropriate, normal grooming and affect   EYES: Eyes grossly normal to inspection, EOMI, conjunctivae and sclerae normal  HENT: ear canals and TM's normal, nose and mouth without ulcers or " lesions  NECK: no adenopathy, no asymmetry, masses, or scars and thyroid normal to palpation  RESP: lungs clear to auscultation - no rales, rhonchi or wheezes  NECK supply without thyromegaly or masses. Tenderness to palpation with the neck musculature suggestive of muscle strain   CV: regular rate and rhythm, normal S1 S2, no S3 or S4, no murmur, click or rub, no peripheral edema and peripheral pulses strong  ABDOMEN: soft, nontender, no hepatosplenomegaly, no masses and bowel sounds normal  MS: no gross musculoskeletal defects noted, no edema  SKIN: no suspicious lesions or rashes to visible skin , some changes with hands consistent with osteoarthritis and some evidence of trigger finger   NEURO: Mentation intact and speech normal  PSYCH: mentation appears normal, affect normal/bright    ASSESSMENT/PLAN:   (Z00.00) Routine general medical examination at a health care facility  (primary encounter diagnosis)  Comment: routine screening   Plan: Hemoglobin            (Z12.11) Screen for colon cancer  Comment: due for this test / procedure / healthcare maintenance   Plan: GASTROENTEROLOGY ADULT REF PROCEDURE ONLY            (Z23) Need for prophylactic vaccination and inoculation against influenza  Comment:   Plan:     (H18.59) Anterior corneal dystrophy, ou  Comment:   Plan:     (M54.2) Cervicalgia  Comment: start with physical therapy. Spouse Sravanthi was strongly in favor of trying a medrol dosepak ; the evidence of benefit from this treatment is smoky at best but likelihood of harm is low and so we can try this, once  Plan: XR Cervical Spine 2/3 Views, ARELIS PT, HAND, AND         CHIROPRACTIC REFERRAL, methylPREDNISolone         (MEDROL DOSEPAK) 4 MG tablet            (M65.30) Trigger finger, acquired  Comment: may need a steroid injection or procedure   Plan: ORTHOPEDICS ADULT REFERRAL            (E78.5) Hyperlipidemia LDL goal <130  Comment: due for this test / procedure / healthcare maintenance   Plan: Lipid panel  "reflex to direct LDL Fasting,         pravastatin (PRAVACHOL) 20 MG tablet            (I10) Hypertension goal BP (blood pressure) < 140/90  Comment: controlled within acceptable limits   Plan: Basic metabolic panel, metoprolol tartrate         (LOPRESSOR) 100 MG tablet            (R73.09) Elevated glucose  Comment: doubt clinical significance but warrants hemoglobin a1c  [ diabetes test ]   Plan: Basic metabolic panel, Hemoglobin A1c            (E03.9) Acquired hypothyroidism  Comment: recheck   Plan: TSH WITH FREE T4 REFLEX, levothyroxine         (SYNTHROID/LEVOTHROID) 75 MCG tablet              COUNSELING:   Reviewed preventive health counseling, as reflected in patient instructions       Regular exercise       Healthy diet/nutrition       Vision screening       Hearing screening       Alcohol Use       Colon cancer screening       Prostate cancer screening        reports that he has never smoked. He has never used smokeless tobacco.  Estimated body mass index is 34.23 kg/(m^2) as calculated from the following:    Height as of this encounter: 1.734 m (5' 8.25\").    Weight as of this encounter: 102.9 kg (226 lb 12.8 oz).   Weight management plan: Discussed healthy diet and exercise guidelines and patient will follow up in 12 months in clinic to re-evaluate.    Counseling Resources:  ATP IV Guidelines  Pooled Cohorts Equation Calculator  FRAX Risk Assessment  ICSI Preventive Guidelines  Dietary Guidelines for Americans, 2010  USDA's MyPlate  ASA Prophylaxis  Lung CA Screening    The information this document, created by the medical scribe for me, accurately reflects the services I personally performed and the decisions made by me. I have reviewed and approved this document for accuracy.   MD Moreno Yang MD  [facility]  Answers for HPI/ROS submitted by the patient on 2/23/2018   PHQ-2 Score: 0    "

## 2018-02-25 ENCOUNTER — MYC MEDICAL ADVICE (OUTPATIENT)
Dept: FAMILY MEDICINE | Facility: CLINIC | Age: 63
End: 2018-02-25

## 2018-02-26 NOTE — TELEPHONE ENCOUNTER
Dr. Padilla,     Please advise on result note for Cervical Spine XR completed on 2/23/2018.    Nicki Kumar RN

## 2018-06-13 ENCOUNTER — OFFICE VISIT (OUTPATIENT)
Dept: ORTHOPEDICS | Facility: CLINIC | Age: 63
End: 2018-06-13
Payer: COMMERCIAL

## 2018-06-13 DIAGNOSIS — M65.341 TRIGGER RING FINGER OF RIGHT HAND: Primary | ICD-10-CM

## 2018-06-13 PROCEDURE — 99203 OFFICE O/P NEW LOW 30 MIN: CPT | Mod: 25 | Performed by: FAMILY MEDICINE

## 2018-06-13 PROCEDURE — 20551 NJX 1 TENDON ORIGIN/INSJ: CPT | Mod: F8 | Performed by: FAMILY MEDICINE

## 2018-06-13 RX ORDER — TRIAMCINOLONE ACETONIDE 40 MG/ML
20 INJECTION, SUSPENSION INTRA-ARTICULAR; INTRAMUSCULAR ONCE
Qty: 0.5 ML | Refills: 0 | OUTPATIENT
Start: 2018-06-13 | End: 2018-06-13

## 2018-06-13 NOTE — PROGRESS NOTES
"Sonny Armstrong  :  1955  DOS: 2018  MRN: [id number]    Sports Medicine Clinic Visit    PCP: Moreno Padilla    Sonny Armstrong is a 62 year old Right hand dominant male who is seen as a self referral presenting with right ring finger pain.    Injury: Insidious onset of catching/triggering sensation in right ring finger over the last ~ 3 months.  Pain located over right ring finger, nonradiating.  Additional Features:  Positive: catching and trigger finger contracture.  Symptoms are better with Rest and taping finger.  Symptoms are worse with: gripping/grasping, waking in AM.  Other evaluation and/or treatments so far consists of: taping finger.  Recent imaging completed: No recent imaging completed.  Prior History of related problems: none    Social History: retired    Review of Systems  Musculoskeletal: as above  Remainder of review of systems is negative including constitutional, CV, pulmonary, GI, Skin and Neurologic except as noted in HPI or medical history.    Past Medical History:   Diagnosis Date     Hyperlipidemia LDL goal <130 2010     Hypertension goal BP (blood pressure) < 140/90 2010     Hypothyroidism 2010     BURDICK (nonalcoholic steatohepatitis)     had liver biopsy     Past Surgical History:   Procedure Laterality Date     TONSILLECTOMY         Objective  BP (P) 136/86  Ht (P) 5' 8.25\" (1.734 m)  Wt (P) 233 lb (105.7 kg)  BMI (P) 35.17 kg/m2    General: healthy, alert and in no distress    HEENT: no scleral icterus or conjunctival erythema   Skin: no suspicious lesions or rash. No jaundice.   CV: regular rhythm by palpation, 2+ distal pulses, no pedal edema    Resp: normal respiratory effort without conversational dyspnea   Psych: normal mood and affect    Gait: nonantalgic, appropriate coordination and balance   Neuro: normal light touch sensory exam of the extremities. Motor strength as noted below     R hand exam  Inspection: Ring Finger:  slight " swelling, over MCP, flexor tendon nodule  Tender: Ring Finger:   MCP joint, triggering, A1 Pulley  Non-tender: remainder of hand  Range of Motion Ring Finger:   Painful full extension, triggering of A1 pulley with flexion  Strength: full strength    Procedure  After risks, benefits and complications of steroid injection were discussed with the patient, a consent form was signed and the patient elected to proceed.  Using sterile technique, the area was first prepped with betadyne and an alcohol swab.  A 25 gauge  needle was used to inject a mixture of 20 mg Kenalog and 0.5 ml of 1% lidocaine A1 pulley on the right ring finger.  The patient tolerated the procedure well without complications.    Radiology:  No imaging needed today    Assessment:  1. Trigger ring finger of right hand        Plan:  Discussed the assessment with the patient.  Follow up: prn  Has tried PIP taping for 3 months with minimal relief  CSI today, max 2 per lifetime discussed in detail  If effective but temporary x2, would refer to hand surgeon  Expectations and goals of CSI reviewed  Often 2-3 days for steroid effect, and can take up to two weeks for maximum effect  We discussed modified progressive pain-free activity as tolerated  Do not overuse in first two weeks if feeling better due to concern for vulnerability while steroid is working  Supportive care reviewed  All questions were answered today  Contact us with additional questions or concerns  Signs and sx of concern reviewed      Dav Vee DO, CAMICHAEL  Primary Care Sports Medicine  [facility]             Disclaimer: This note consists of symbols derived from keyboarding, dictation and/or voice recognition software. As a result, there may be errors in the script that have gone undetected. Please consider this when interpreting information found in this chart.

## 2018-06-13 NOTE — LETTER
"    2018         RE: Sonny Armstrong  [address] [address]        Dear Colleague,    Thank you for referring your patient, Sonny Armstrong, to the [facility]. Please see a copy of my visit note below.    Sonny Armstrong  :  1955  DOS: 2018  MRN: [id number]    Sports Medicine Clinic Visit    PCP: Moreno Padilla    Sonny Armstrong is a 62 year old Right hand dominant male who is seen as a self referral presenting with right ring finger pain.    Injury: Insidious onset of catching/triggering sensation in right ring finger over the last ~ 3 months.  Pain located over right ring finger, nonradiating.  Additional Features:  Positive: catching and trigger finger contracture.  Symptoms are better with Rest and taping finger.  Symptoms are worse with: gripping/grasping, waking in AM.  Other evaluation and/or treatments so far consists of: taping finger.  Recent imaging completed: No recent imaging completed.  Prior History of related problems: none    Social History: retired    Review of Systems  Musculoskeletal: as above  Remainder of review of systems is negative including constitutional, CV, pulmonary, GI, Skin and Neurologic except as noted in HPI or medical history.    Past Medical History:   Diagnosis Date     Hyperlipidemia LDL goal <130 2010     Hypertension goal BP (blood pressure) < 140/90 2010     Hypothyroidism 2010     BURDICK (nonalcoholic steatohepatitis)     had liver biopsy     Past Surgical History:   Procedure Laterality Date     TONSILLECTOMY         Objective  BP (P) 136/86  Ht (P) 5' 8.25\" (1.734 m)  Wt (P) 233 lb (105.7 kg)  BMI (P) 35.17 kg/m2    General: healthy, alert and in no distress    HEENT: no scleral icterus or conjunctival erythema   Skin: no suspicious lesions or rash. No jaundice.   CV: regular rhythm by palpation, 2+ distal pulses, no pedal edema    Resp: normal respiratory " effort without conversational dyspnea   Psych: normal mood and affect    Gait: nonantalgic, appropriate coordination and balance   Neuro: normal light touch sensory exam of the extremities. Motor strength as noted below     R hand exam  Inspection: Ring Finger:  slight swelling, over MCP, flexor tendon nodule  Tender: Ring Finger:   MCP joint, triggering, A1 Pulley  Non-tender: remainder of hand  Range of Motion Ring Finger:   Painful full extension, triggering of A1 pulley with flexion  Strength: full strength    Procedure  After risks, benefits and complications of steroid injection were discussed with the patient, a consent form was signed and the patient elected to proceed.  Using sterile technique, the area was first prepped with betadyne and an alcohol swab.  A 25 gauge  needle was used to inject a mixture of 20 mg Kenalog and 0.5 ml of 1% lidocaine A1 pulley on the right ring finger.  The patient tolerated the procedure well without complications.    Radiology:  No imaging needed today    Assessment:  1. Trigger ring finger of right hand        Plan:  Discussed the assessment with the patient.  Follow up: prn  Has tried PIP taping for 3 months with minimal relief  CSI today, max 2 per lifetime discussed in detail  If effective but temporary x2, would refer to hand surgeon  Expectations and goals of CSI reviewed  Often 2-3 days for steroid effect, and can take up to two weeks for maximum effect  We discussed modified progressive pain-free activity as tolerated  Do not overuse in first two weeks if feeling better due to concern for vulnerability while steroid is working  Supportive care reviewed  All questions were answered today  Contact us with additional questions or concerns  Signs and sx of concern reviewed      Dav Vee DO, LOULOU  Primary Care Sports Medicine  [facility]             Disclaimer: This note consists of symbols derived from keyboarding, dictation and/or voice  recognition software. As a result, there may be errors in the script that have gone undetected. Please consider this when interpreting information found in this chart.    Again, thank you for allowing me to participate in the care of your patient.        Sincerely,        Dav Vee, DO

## 2018-06-13 NOTE — MR AVS SNAPSHOT
After Visit Summary   6/13/2018    Sonny Armstrong    MRN: [id number]           Patient Information     Date Of Birth          1955        Visit Information        Provider Department      6/13/2018 2:20 PM Dav Vee DO [facility]        Today's Diagnoses     Trigger ring finger of right hand    -  1       Follow-ups after your visit        Who to contact     If you have questions or need follow up information about today's clinic visit or your schedule please contact [facility] directly at [phone].  Normal or non-critical lab and imaging results will be communicated to you by Firethornhart, letter or phone within 4 business days after the clinic has received the results. If you do not hear from us within 7 days, please contact the clinic through Ailvxing nett or phone. If you have a critical or abnormal lab result, we will notify you by phone as soon as possible.  Submit refill requests through CloudFloor or call your pharmacy and they will forward the refill request to us. Please allow 3 business days for your refill to be completed.          Additional Information About Your Visit        MyChart Information     CloudFloor gives you secure access to your electronic health record. If you see a primary care provider, you can also send messages to your care team and make appointments. If you have questions, please call your primary care clinic.  If you do not have a primary care provider, please call [phone] and they will assist you.        Care EveryWhere ID     This is your Care EveryWhere ID. This could be used by other organizations to access your [facility] medical records  [id number]         Blood Pressure from Last 3 Encounters:   06/13/18 (P) 136/86   02/23/18 130/86   02/16/17 128/80    Weight from Last 3 Encounters:   06/13/18 (P) 233 lb (105.7 kg)   02/23/18 226 lb 12.8 oz (102.9 kg)   02/16/17 227 lb (103 kg)               We Performed the Following     INJECTION SINGLE TENDON ORIGIN/INSERTION     TRIAMCINOLONE ACET INJ NOS          Today's Medication Changes          These changes are accurate as of 6/13/18  4:36 PM.  If you have any questions, ask your nurse or doctor.               Start taking these medicines.        Dose/Directions    triamcinolone acetonide 40 MG/ML injection   Commonly known as:  KENALOG-40   Used for:  Trigger ring finger of right hand   Started by:  Dav Vee DO        Dose:  20 mg   0.5 mLs (20 mg) by INTRA-ARTICULAR route once for 1 dose   Quantity:  0.5 mL   Refills:  0            Where to get your medicines      Some of these will need a paper prescription and others can be bought over the counter.  Ask your nurse if you have questions.     You don't need a prescription for these medications     triamcinolone acetonide 40 MG/ML injection                Primary Care Provider Office Phone # Fax #    Moreno Padilla [phone]       [address] [place] [id number]        Equal Access to Services     [facility] CenterJOSE : Hadii aad ku hadasho Soomaali, waaxda luqadaha, qaybta kaalmada adeegyada, waxay idiin haybrooke garcia . So [facility] [phone].    ATENCIÓN: Si habla español, tiene a velez disposición servicios gratuitos de asistencia lingüística. Llame al [phone].    We comply with applicable federal civil rights laws and [place] laws. We do not discriminate on the basis of race, color, national origin, age, disability, sex, sexual orientation, or gender identity.            Thank you!     Thank you for choosing [facility]  for your care. Our goal is always to provide you with excellent care. Hearing back from our patients is one way we can continue to improve our services. Please take a few minutes to complete the written survey that you may receive in the mail after your visit with us. Thank you!             Your  Updated Medication List - Protect others around you: Learn how to safely use, store and throw away your medicines at www.disposemymeds.org.          This list is accurate as of 6/13/18  4:36 PM.  Always use your most recent med list.                   Brand Name Dispense Instructions for use Diagnosis    aspirin 81 MG tablet      Take 1 tablet by mouth daily.        fish oil-omega-3 fatty acids 1000 MG capsule      Take 3 capsules by mouth daily.        levothyroxine 75 MCG tablet    SYNTHROID/LEVOTHROID    90 tablet    Take 1 tablet (75 mcg) by mouth daily    Acquired hypothyroidism       methylPREDNISolone 4 MG tablet    MEDROL DOSEPAK    21 tablet    Follow package instructions    Cervicalgia       metoprolol tartrate 100 MG tablet    LOPRESSOR    90 tablet    TAKE ONE-HALF BY MOUTH TWICE DAILY    Hypertension goal BP (blood pressure) < 140/90       olopatadine 0.1 % ophthalmic solution    PATANOL    5 mL    Place 1 drop into both eyes 2 times daily    Itchy eyes       pravastatin 20 MG tablet    PRAVACHOL    48 tablet    Take 1 tab (20mg) 4 days per week    Hyperlipidemia LDL goal <130       triamcinolone acetonide 40 MG/ML injection    KENALOG-40    0.5 mL    0.5 mLs (20 mg) by INTRA-ARTICULAR route once for 1 dose    Trigger ring finger of right hand

## 2018-10-23 DIAGNOSIS — I10 HYPERTENSION GOAL BP (BLOOD PRESSURE) < 140/90: ICD-10-CM

## 2018-10-23 RX ORDER — METOPROLOL TARTRATE 100 MG
TABLET ORAL
Qty: 90 TABLET | Refills: 0 | Status: SHIPPED | OUTPATIENT
Start: 2018-10-23 | End: 2019-01-07

## 2018-10-23 NOTE — TELEPHONE ENCOUNTER
Patient wrote MyChart message through spouse's MyChart and reports losing metoprolol and needs a refill.     Refill sent to pharmacy.   MyChart message sent.     Iris Ramos RN

## 2018-11-15 ENCOUNTER — SURGERY (OUTPATIENT)
Age: 63
End: 2018-11-15

## 2018-11-15 ENCOUNTER — HOSPITAL ENCOUNTER (OUTPATIENT)
Facility: AMBULATORY SURGERY CENTER | Age: 63
Discharge: HOME | End: 2018-11-15
Attending: SURGERY | Admitting: SURGERY
Payer: COMMERCIAL

## 2018-11-15 VITALS
TEMPERATURE: 98.2 F | RESPIRATION RATE: 16 BRPM | SYSTOLIC BLOOD PRESSURE: 121 MMHG | DIASTOLIC BLOOD PRESSURE: 67 MMHG | OXYGEN SATURATION: 96 %

## 2018-11-15 LAB — COLONOSCOPY: NORMAL

## 2018-11-15 PROCEDURE — 99152 MOD SED SAME PHYS/QHP 5/>YRS: CPT | Mod: 59 | Performed by: SURGERY

## 2018-11-15 PROCEDURE — 45385 COLONOSCOPY W/LESION REMOVAL: CPT | Mod: PT | Performed by: SURGERY

## 2018-11-15 PROCEDURE — 45385 COLONOSCOPY W/LESION REMOVAL: CPT

## 2018-11-15 PROCEDURE — 99153 MOD SED SAME PHYS/QHP EA: CPT | Mod: 59 | Performed by: SURGERY

## 2018-11-15 PROCEDURE — G8918 PT W/O PREOP ORDER IV AB PRO: HCPCS

## 2018-11-15 PROCEDURE — G8907 PT DOC NO EVENTS ON DISCHARG: HCPCS

## 2018-11-15 PROCEDURE — 88305 TISSUE EXAM BY PATHOLOGIST: CPT | Performed by: PATHOLOGY

## 2018-11-15 RX ORDER — FENTANYL CITRATE 50 UG/ML
INJECTION, SOLUTION INTRAMUSCULAR; INTRAVENOUS PRN
Status: DISCONTINUED | OUTPATIENT
Start: 2018-11-15 | End: 2018-11-15 | Stop reason: HOSPADM

## 2018-11-15 RX ORDER — LIDOCAINE 40 MG/G
CREAM TOPICAL
Status: DISCONTINUED | OUTPATIENT
Start: 2018-11-15 | End: 2018-11-16 | Stop reason: HOSPADM

## 2018-11-15 RX ADMIN — FENTANYL CITRATE 50 MCG: 50 INJECTION, SOLUTION INTRAMUSCULAR; INTRAVENOUS at 11:22

## 2018-11-15 RX ADMIN — FENTANYL CITRATE 100 MCG: 50 INJECTION, SOLUTION INTRAMUSCULAR; INTRAVENOUS at 11:18

## 2018-11-20 LAB — COPATH REPORT: NORMAL

## 2019-01-07 ENCOUNTER — OFFICE VISIT (OUTPATIENT)
Dept: FAMILY MEDICINE | Facility: CLINIC | Age: 64
End: 2019-01-07
Payer: COMMERCIAL

## 2019-01-07 ENCOUNTER — ANCILLARY PROCEDURE (OUTPATIENT)
Dept: GENERAL RADIOLOGY | Facility: CLINIC | Age: 64
End: 2019-01-07
Payer: COMMERCIAL

## 2019-01-07 VITALS
DIASTOLIC BLOOD PRESSURE: 76 MMHG | HEART RATE: 59 BPM | TEMPERATURE: 97.5 F | OXYGEN SATURATION: 96 % | RESPIRATION RATE: 18 BRPM | BODY MASS INDEX: 33.77 KG/M2 | SYSTOLIC BLOOD PRESSURE: 136 MMHG | WEIGHT: 228 LBS | HEIGHT: 69 IN

## 2019-01-07 DIAGNOSIS — Z11.4 ENCOUNTER FOR SCREENING FOR HIV: ICD-10-CM

## 2019-01-07 DIAGNOSIS — M25.511 CHRONIC RIGHT SHOULDER PAIN: ICD-10-CM

## 2019-01-07 DIAGNOSIS — R73.09 ELEVATED GLUCOSE: ICD-10-CM

## 2019-01-07 DIAGNOSIS — E03.9 ACQUIRED HYPOTHYROIDISM: ICD-10-CM

## 2019-01-07 DIAGNOSIS — I10 HYPERTENSION GOAL BP (BLOOD PRESSURE) < 140/90: ICD-10-CM

## 2019-01-07 DIAGNOSIS — Z23 NEED FOR SHINGLES VACCINE: ICD-10-CM

## 2019-01-07 DIAGNOSIS — G89.29 CHRONIC RIGHT SHOULDER PAIN: ICD-10-CM

## 2019-01-07 DIAGNOSIS — E78.5 HYPERLIPIDEMIA LDL GOAL <130: Primary | ICD-10-CM

## 2019-01-07 DIAGNOSIS — K75.81 NASH (NONALCOHOLIC STEATOHEPATITIS): ICD-10-CM

## 2019-01-07 LAB
ALT SERPL W P-5'-P-CCNC: 49 U/L (ref 0–70)
ANION GAP SERPL CALCULATED.3IONS-SCNC: 7 MMOL/L (ref 3–14)
BUN SERPL-MCNC: 18 MG/DL (ref 7–30)
CALCIUM SERPL-MCNC: 10 MG/DL (ref 8.5–10.1)
CHLORIDE SERPL-SCNC: 100 MMOL/L (ref 94–109)
CHOLEST SERPL-MCNC: 218 MG/DL
CO2 SERPL-SCNC: 30 MMOL/L (ref 20–32)
CREAT SERPL-MCNC: 1 MG/DL (ref 0.66–1.25)
GFR SERPL CREATININE-BSD FRML MDRD: 81 ML/MIN/{1.73_M2}
GLUCOSE SERPL-MCNC: 110 MG/DL (ref 70–99)
HBA1C MFR BLD: 5.2 % (ref 0–5.6)
HDLC SERPL-MCNC: 48 MG/DL
HGB BLD-MCNC: 16.7 G/DL (ref 13.3–17.7)
LDLC SERPL CALC-MCNC: 146 MG/DL
NONHDLC SERPL-MCNC: 170 MG/DL
POTASSIUM SERPL-SCNC: 5.1 MMOL/L (ref 3.4–5.3)
SODIUM SERPL-SCNC: 137 MMOL/L (ref 133–144)
T4 FREE SERPL-MCNC: 1.05 NG/DL (ref 0.76–1.46)
TRIGL SERPL-MCNC: 121 MG/DL
TSH SERPL DL<=0.005 MIU/L-ACNC: 4.21 MU/L (ref 0.4–4)

## 2019-01-07 PROCEDURE — 36415 COLL VENOUS BLD VENIPUNCTURE: CPT | Performed by: INTERNAL MEDICINE

## 2019-01-07 PROCEDURE — 80061 LIPID PANEL: CPT | Performed by: INTERNAL MEDICINE

## 2019-01-07 PROCEDURE — 84460 ALANINE AMINO (ALT) (SGPT): CPT | Performed by: INTERNAL MEDICINE

## 2019-01-07 PROCEDURE — 87389 HIV-1 AG W/HIV-1&-2 AB AG IA: CPT | Performed by: INTERNAL MEDICINE

## 2019-01-07 PROCEDURE — 73030 X-RAY EXAM OF SHOULDER: CPT | Mod: RT

## 2019-01-07 PROCEDURE — 84439 ASSAY OF FREE THYROXINE: CPT | Performed by: INTERNAL MEDICINE

## 2019-01-07 PROCEDURE — 85018 HEMOGLOBIN: CPT | Performed by: INTERNAL MEDICINE

## 2019-01-07 PROCEDURE — 99214 OFFICE O/P EST MOD 30 MIN: CPT | Performed by: INTERNAL MEDICINE

## 2019-01-07 PROCEDURE — 83036 HEMOGLOBIN GLYCOSYLATED A1C: CPT | Performed by: INTERNAL MEDICINE

## 2019-01-07 PROCEDURE — 84443 ASSAY THYROID STIM HORMONE: CPT | Performed by: INTERNAL MEDICINE

## 2019-01-07 PROCEDURE — 80048 BASIC METABOLIC PNL TOTAL CA: CPT | Performed by: INTERNAL MEDICINE

## 2019-01-07 RX ORDER — METOPROLOL TARTRATE 100 MG
TABLET ORAL
Qty: 90 TABLET | Refills: 0 | Status: SHIPPED | OUTPATIENT
Start: 2019-01-07 | End: 2019-02-05

## 2019-01-07 RX ORDER — HYDROCHLOROTHIAZIDE 12.5 MG/1
12.5 CAPSULE ORAL DAILY
Qty: 90 CAPSULE | Refills: 3 | Status: SHIPPED | OUTPATIENT
Start: 2019-01-07 | End: 2020-01-02

## 2019-01-07 RX ORDER — PRAVASTATIN SODIUM 20 MG
TABLET ORAL
Qty: 48 TABLET | Refills: 3 | Status: SHIPPED | OUTPATIENT
Start: 2019-01-07 | End: 2020-01-27

## 2019-01-07 RX ORDER — LEVOTHYROXINE SODIUM 75 UG/1
75 TABLET ORAL DAILY
Qty: 90 TABLET | Refills: 3 | Status: SHIPPED | OUTPATIENT
Start: 2019-01-07 | End: 2020-01-02

## 2019-01-07 ASSESSMENT — MIFFLIN-ST. JEOR: SCORE: 1816.64

## 2019-01-07 NOTE — PROGRESS NOTES
SUBJECTIVE:   Sonny Armstrong is a 63 year old male who presents to clinic today for the following health issues:       Hyperlipidemia LDL goal <130  Hypertension goal BP (blood pressure) < 140/90  Elevated glucose  BURDICK (nonalcoholic steatohepatitis)  Acquired hypothyroidism  Encounter for screening for HIV  Need for shingles vaccine  Chronic right shoulder pain     BP Readings from Last 3 Encounters:   01/07/19 136/76   11/15/18 121/67   06/13/18 (P) 136/86     Wt Readings from Last 5 Encounters:   01/07/19 103.4 kg (228 lb)   06/13/18 (P) 105.7 kg (233 lb)   02/23/18 102.9 kg (226 lb 12.8 oz)   02/16/17 103 kg (227 lb)   08/04/15 99.8 kg (220 lb)     Body mass index is 34.16 kg/m .  Decreased physical activity / not involved with regular cardiovascular fitness exercises, however generally doing well and today is a routine follow up office visit     Recheck Medication and Hypertension    Last appointment with me was was complete physical exam 2/2018    Good candidate for a  ShingRx vaccination against herpes zoster   Basic metabolic panel   Fasting lipid panel   Hemoglobin   Human immunodeficiency virus ( HIV )   Fasting lipid panel     Feels his blood pressure is spiking in the afternoons. He's taking his anti-hypertensive medication 2 times a day [ half dose 2 times a day ]. 1-2 times per week he is taking a 3rd half dose per 24 hours. Some readings up to 180 systolic blood pressure . He's thus running out of the medication too soon.      Problem list and histories reviewed & adjusted, as indicated.  Additional history: as documented    Patient Active Problem List   Diagnosis     Hyperlipidemia LDL goal <130     Hypertension goal BP (blood pressure) < 140/90     BURDICK (nonalcoholic steatohepatitis)     Obesity     Elevated glucose     Chronic fatigue     Advanced directives, counseling/discussion     Hx of LASIK, ou     Anterior corneal dystrophy, ou     Acquired hypothyroidism     Past Surgical History:    Procedure Laterality Date     COLONOSCOPY WITH CO2 INSUFFLATION N/A 11/15/2018    Procedure: COLONOSCOPY WITH CO2 INSUFFLATION;  Surgeon: Timi Ballard MD;  Location: [facility] OR     TONSILLECTOMY  1971       Social History     Tobacco Use     Smoking status: Never Smoker     Smokeless tobacco: Never Used   Substance Use Topics     Alcohol use: No     Family History   Problem Relation Age of Onset     Heart Disease Father      Diabetes Maternal Grandfather      Thyroid Disease Daughter          Current Outpatient Medications   Medication Sig Dispense Refill     aspirin 81 MG tablet Take 1 tablet by mouth daily.       hydrochlorothiazide (MICROZIDE) 12.5 MG capsule Take 1 capsule (12.5 mg) by mouth daily 90 capsule 3     levothyroxine (SYNTHROID/LEVOTHROID) 75 MCG tablet Take 1 tablet (75 mcg) by mouth daily 90 tablet 3     metoprolol tartrate (LOPRESSOR) 100 MG tablet TAKE ONE-HALF BY MOUTH TWICE DAILY 90 tablet 0     olopatadine (PATANOL) 0.1 % ophthalmic solution Place 1 drop into both eyes 2 times daily 5 mL 1     pravastatin (PRAVACHOL) 20 MG tablet Take 1 tab (20mg) 4 days per week 48 tablet 3     fish oil-omega-3 fatty acids (FISH OIL) 1000 MG capsule Take 3 capsules by mouth daily.       No Known Allergies  Recent Labs   Lab Test 01/07/19  1515 02/23/18  1448 02/16/17  1724 08/04/15  1502 08/21/14  1457 08/19/13  1505   A1C 5.2 5.1 5.0 5.4 4.9 5.0   LDL  --  123* 190* 141* 140* 175*   HDL  --  57 59 50 61 49   TRIG  --  100 109 125 103 113   ALT  --   --   --  48 45 51   CR  --  0.94 0.92 1.00 1.03 0.71   GFRESTIMATED  --  82 84 77 74 >90   GFRESTBLACK  --  >90 >90   GFR Calc   >90   GFR Calc   90 >90   POTASSIUM  --  4.5 4.5 4.1 4.4 4.5   TSH  --  4.04* 3.53 1.54 2.94 1.28      BP Readings from Last 3 Encounters:   01/07/19 136/76   11/15/18 121/67   06/13/18 (P) 136/86    Wt Readings from Last 3 Encounters:   01/07/19 103.4 kg (228 lb)   06/13/18 (P) 105.7 kg (233 lb)  "  02/23/18 102.9 kg (226 lb 12.8 oz)                  Labs reviewed in EPIC    Reviewed and updated as needed this visit by clinical staff  Tobacco  Allergies  Meds  Med Hx  Surg Hx  Fam Hx  Soc Hx      Reviewed and updated as needed this visit by Provider         ROS:  Constitutional, HEENT, cardiovascular, pulmonary, gi and gu systems are negative, except as otherwise noted.    OBJECTIVE:                                                    /76   Pulse 59   Temp 97.5  F (36.4  C) (Oral)   Resp 18   Ht 1.74 m (5' 8.5\")   Wt 103.4 kg (228 lb)   SpO2 96%   BMI 34.16 kg/m    Body mass index is 34.16 kg/m .  GENERAL APPEARANCE: healthy, alert and no distress  EYES: Eyes grossly normal to inspection, PERRL and conjunctivae and sclerae normal  RESP: lungs clear to auscultation - no rales, rhonchi or wheezes  CV: regular rates and rhythm, normal S1 S2, no S3 or S4 and no murmur, click or rub    Diagnostic test results:  Diagnostic Test Results:  Orders Placed This Encounter   Procedures     XR Shoulder Right G/E 3 Views     Lipid panel reflex to direct LDL Non-fasting     Basic metabolic panel     Hemoglobin     HIV Antigen Antibody Combo     Hemoglobin A1c     ALT     TSH with free T4 reflex     SPORTS MEDICINE REFERRAL          ASSESSMENT/PLAN:                                                    1. Hyperlipidemia LDL goal <130  Continue current plan of care   , recheck one year   - Lipid panel reflex to direct LDL Non-fasting  - pravastatin (PRAVACHOL) 20 MG tablet; Take 1 tab (20mg) 4 days per week  Dispense: 48 tablet; Refill: 3    2. Hypertension goal BP (blood pressure) < 140/90  We added hydrochlorothiazide   - Basic metabolic panel  - metoprolol tartrate (LOPRESSOR) 100 MG tablet; TAKE ONE-HALF BY MOUTH TWICE DAILY  Dispense: 90 tablet; Refill: 0  - hydrochlorothiazide (MICROZIDE) 12.5 MG capsule; Take 1 capsule (12.5 mg) by mouth daily  Dispense: 90 capsule; Refill: 3  Recommended for blood " pressure readings in 2-4 weeks    3. Elevated glucose  Doubt clinical significance but warrants hemoglobin a1c  [ diabetes test ]   - Basic metabolic panel  - Hemoglobin A1c    4. BURDICK (nonalcoholic steatohepatitis)  Recheck   - Hemoglobin  - ALT    5. Acquired hypothyroidism  Recheck   - TSH with free T4 reflex  - levothyroxine (SYNTHROID/LEVOTHROID) 75 MCG tablet; Take 1 tablet (75 mcg) by mouth daily  Dispense: 90 tablet; Refill: 3    6. Encounter for screening for HIV  Routine screening   - HIV Antigen Antibody Combo    7. Need for shingles vaccine  Pharmacy router form given     8. Chronic right shoulder pain  We reviewed his shoulder concerns, patient requested a referral back to see Dr. Vee as to patient is under the impression that a simple steroid injection is going to fix his troubles whereas I had at first recommended [facility] as his symptoms seem most consistent with a tendinitis  Condition. Nevertheless we will get   - XR Shoulder Right G/E 3 Views; Future  - SPORTS MEDICINE REFERRAL      Follow up with Provider - 6-12 months / on an as needed basis      Moreno Padilla MD  [facility]

## 2019-01-07 NOTE — PROGRESS NOTES
Last appointment with me was was complete physical exam 2/2018    Good candidate for a  ShingRx vaccination against herpes zoster   Basic metabolic panel   Fasting lipid panel   Hemoglobin   Human immunodeficiency virus ( HIV )   Fasting lipid panel     Feels his blood pressure is spiking in the afternoons. He's taking his anti-hypertensive medication 2 times a day [ half dose 2 times a day ]. 1-2 times per week he is taking a 3rd half dose per 24 hours. Some readings up to 180 systolic blood pressure . He's thus running out of the medication too soon.

## 2019-01-08 LAB — HIV 1+2 AB+HIV1 P24 AG SERPL QL IA: NONREACTIVE

## 2019-01-09 NOTE — RESULT ENCOUNTER NOTE
Dear Sonny,    Your recent test results are attached.      No HIV.    If you have any questions please feel free to contact [phone] or myself via Reading Rainbowt.    Sincerely,  Osiris Bryant, CNP

## 2019-01-30 ENCOUNTER — OFFICE VISIT (OUTPATIENT)
Dept: ORTHOPEDICS | Facility: CLINIC | Age: 64
End: 2019-01-30
Payer: COMMERCIAL

## 2019-01-30 VITALS
BODY MASS INDEX: 33.77 KG/M2 | SYSTOLIC BLOOD PRESSURE: 152 MMHG | DIASTOLIC BLOOD PRESSURE: 91 MMHG | HEIGHT: 69 IN | WEIGHT: 228 LBS

## 2019-01-30 DIAGNOSIS — M25.511 CHRONIC RIGHT SHOULDER PAIN: Primary | ICD-10-CM

## 2019-01-30 DIAGNOSIS — M75.51 SUBACROMIAL BURSITIS OF RIGHT SHOULDER JOINT: ICD-10-CM

## 2019-01-30 DIAGNOSIS — G89.29 CHRONIC RIGHT SHOULDER PAIN: Primary | ICD-10-CM

## 2019-01-30 PROCEDURE — 99243 OFF/OP CNSLTJ NEW/EST LOW 30: CPT | Mod: 25 | Performed by: FAMILY MEDICINE

## 2019-01-30 PROCEDURE — 20611 DRAIN/INJ JOINT/BURSA W/US: CPT | Mod: RT | Performed by: FAMILY MEDICINE

## 2019-01-30 RX ADMIN — ROPIVACAINE HYDROCHLORIDE 3 ML: 5 INJECTION, SOLUTION EPIDURAL; INFILTRATION; PERINEURAL at 18:15

## 2019-01-30 RX ADMIN — TRIAMCINOLONE ACETONIDE 40 MG: 40 INJECTION, SUSPENSION INTRA-ARTICULAR; INTRAMUSCULAR at 18:15

## 2019-01-30 ASSESSMENT — MIFFLIN-ST. JEOR: SCORE: 1816.64

## 2019-01-30 NOTE — LETTER
2019         RE: Sonny Armstrong  [address] [facility] [address]        Dear Colleague,    Thank you for referring your patient, Sonny Armstrong, to the [facility] [place]. Please see a copy of my visit note below.    Sonny Armstrong  :  1955  DOS: 2019  MRN: [id number]    Sports Medicine Clinic Visit    PCP: Moreno Padilla    Sonny Armstrong is a 63 year old Right hand dominant male who is seen in consultation at the request of  Moreno Padilla M.D. presenting with chronic right shoulder pain.    Injury: Gradual onset of right shoulder pain over the last ~ 8 - 9 months.  Pain located over right deep lateral and posterior shoulder, nonradiating.  Additional Features:  Positive: swelling, grinding and locking (morning stiffness).  Symptoms are better with Rest and movement throughout the day.  Symptoms are worse with: lying on right shoulder, shoulder flexion/abduction, lifting, reaching behind back.  Other evaluation and/or treatments so far consists of: Ice, Ibuprofen, Rest and PCP.  Recent imaging completed: X-rays completed 19.  Prior History of related problems: H/o right shoulder calcific tendinitis ~ 20+ years ago that improved following steroid injection.    Social History: retired    Review of Systems  Musculoskeletal: as above  Remainder of review of systems is negative including constitutional, CV, pulmonary, GI, Skin and Neurologic except as noted in HPI or medical history.    Past Medical History:   Diagnosis Date     Hyperlipidemia LDL goal <130 2010     Hypertension goal BP (blood pressure) < 140/90 2010     Hypothyroidism 2010     BURDICK (nonalcoholic steatohepatitis)     had liver biopsy     Past Surgical History:   Procedure Laterality Date     COLONOSCOPY WITH CO2 INSUFFLATION N/A 11/15/2018    Procedure: COLONOSCOPY WITH CO2 INSUFFLATION;  Surgeon: iTmi Ballard MD;  Location:  OR      "TONSILLECTOMY  1971     Objective  BP (!) 152/91   Ht 1.74 m (5' 8.5\")   Wt 103.4 kg (228 lb)   BMI 34.16 kg/m       General: healthy, alert and in no distress    HEENT: no scleral icterus or conjunctival erythema   Skin: no suspicious lesions or rash. No jaundice.   CV: regular rhythm by palpation, 2+ distal pulses, no pedal edema    Resp: normal respiratory effort without conversational dyspnea   Psych: normal mood and affect    Gait: nonantalgic, appropriate coordination and balance   Neuro: normal light touch sensory exam of the extremities. Motor strength as noted below     Right Shoulder exam    ROM:        Full active and passive ROM with flexion, extension, abduction, internal and external rotation.       asymmetric scapular motion on R       Painful terminal flexion and abduction, mildly in ER    Tender:        subacromial space       Lateral deltoid    Non Tender:       remainder of shoulder       sternoclavicular joint       acromioclavicular joint       posterior shoulder       periscapular region    Strength:        abduction 4/5       internal rotation 4/5       external rotation 5/5       adduction 5/5    Impingement testing:        positive (+) Neer       positive (+) Borrero       positive (+) empty can       neg (-) crossover       neg (-) O'brando       neg (-) crank    Stability testing:       neg (-) relocation       neg (-) anterior glide       neg (-) sulcus sign    Skin:       no visible deformities       well perfused       capillary refill brisk    Sensation:        normal sensation over shoulder and upper extremity       Radiology  Results for orders placed or performed in visit on 01/07/19   XR Shoulder Right G/E 3 Views    Narrative    XR RIGHT SHOULDER THREE OR MORE VIEWS  1/7/2019 3:50 PM     HISTORY: Chronic right shoulder pain.      COMPARISON: None      Impression    IMPRESSION: Mild degenerative changes in the acromioclavicular joint.  No evidence of fracture. A 6 mm metallic " density projects over the  right lateral chest. This may be a small foreign body.    NICK PANTOJA MD     Large Joint Injection/Arthocentesis: R subacromial bursa  Date/Time: 1/30/2019 6:15 PM  Performed by: Dav Vee DO  Authorized by: Dav Vee DO     Indications:  Pain  Needle Size:  22 G  Guidance: ultrasound    Approach:  Posterolateral  Location:  Shoulder  Site:  R subacromial bursa  Medications:  40 mg triamcinolone 40 MG/ML; 3 mL ropivacaine 5 MG/ML  Outcome:  Tolerated well, no immediate complications  Procedure discussed: discussed risks, benefits, and alternatives    Consent Given by:  Patient  Prep: patient was prepped and draped in usual sterile fashion            Assessment:  1. Chronic right shoulder pain    2. Subacromial bursitis of right shoulder joint        Plan:  Discussed the assessment with the patient.  Follow up: prn based on clinical progress  XR images independently visualized and reviewed with patient today in clinic  Has AC joint OA with inferior osteophyte, joint not painful however  Osteophyte likely contributory to subacromial impingement and bursitis  US guided subacromial CSI today with good initial relief from anesthetic effect  PT options reviewed, HEP provided  Expectations and goals of CSI reviewed  Often 2-3 days for steroid effect, and can take up to two weeks for maximum effect  We discussed modified progressive pain-free activity as tolerated  Do not overuse in first two weeks if feeling better due to concern for vulnerability while steroid is working  Supportive care reviewed  All questions were answered today  Contact us with additional questions or concerns  Signs and sx of concern reviewed    Thanks very much for sending this nice gentleman to us, I will keep you updated with his progress      Dav Vee DO, CAQ  Primary Care Sports Medicine  [facility]           Disclaimer: This note consists of symbols derived  from keyboarding, dictation and/or voice recognition software. As a result, there may be errors in the script that have gone undetected. Please consider this when interpreting information found in this chart.    Again, thank you for allowing me to participate in the care of your patient.        Sincerely,        Dav Vee, DO

## 2019-01-30 NOTE — PROGRESS NOTES
"Sonny Armstrong  :  1955  DOS: 2019  MRN: [id number]    Sports Medicine Clinic Visit    PCP: Moreno Padilla    Sonny Armstrong is a 63 year old Right hand dominant male who is seen in consultation at the request of  Moreno Padilla M.D. presenting with chronic right shoulder pain.    Injury: Gradual onset of right shoulder pain over the last ~ 8 - 9 months.  Pain located over right deep lateral and posterior shoulder, nonradiating.  Additional Features:  Positive: swelling, grinding and locking (morning stiffness).  Symptoms are better with Rest and movement throughout the day.  Symptoms are worse with: lying on right shoulder, shoulder flexion/abduction, lifting, reaching behind back.  Other evaluation and/or treatments so far consists of: Ice, Ibuprofen, Rest and PCP.  Recent imaging completed: X-rays completed 19.  Prior History of related problems: H/o right shoulder calcific tendinitis ~ 20+ years ago that improved following steroid injection.    Social History: retired    Review of Systems  Musculoskeletal: as above  Remainder of review of systems is negative including constitutional, CV, pulmonary, GI, Skin and Neurologic except as noted in HPI or medical history.    Past Medical History:   Diagnosis Date     Hyperlipidemia LDL goal <130 2010     Hypertension goal BP (blood pressure) < 140/90 2010     Hypothyroidism 2010     BURDICK (nonalcoholic steatohepatitis)     had liver biopsy     Past Surgical History:   Procedure Laterality Date     COLONOSCOPY WITH CO2 INSUFFLATION N/A 11/15/2018    Procedure: COLONOSCOPY WITH CO2 INSUFFLATION;  Surgeon: Timi Ballard MD;  Location: [facility] OR     TONSILLECTOMY       Objective  BP (!) 152/91   Ht 1.74 m (5' 8.5\")   Wt 103.4 kg (228 lb)   BMI 34.16 kg/m      General: healthy, alert and in no distress    HEENT: no scleral icterus or conjunctival erythema   Skin: no suspicious lesions or rash. No jaundice.   CV: regular " rhythm by palpation, 2+ distal pulses, no pedal edema    Resp: normal respiratory effort without conversational dyspnea   Psych: normal mood and affect    Gait: nonantalgic, appropriate coordination and balance   Neuro: normal light touch sensory exam of the extremities. Motor strength as noted below     Right Shoulder exam    ROM:        Full active and passive ROM with flexion, extension, abduction, internal and external rotation.       asymmetric scapular motion on R       Painful terminal flexion and abduction, mildly in ER    Tender:        subacromial space       Lateral deltoid    Non Tender:       remainder of shoulder       sternoclavicular joint       acromioclavicular joint       posterior shoulder       periscapular region    Strength:        abduction 4/5       internal rotation 4/5       external rotation 5/5       adduction 5/5    Impingement testing:        positive (+) Neer       positive (+) Borrero       positive (+) empty can       neg (-) crossover       neg (-) O'brando       neg (-) crank    Stability testing:       neg (-) relocation       neg (-) anterior glide       neg (-) sulcus sign    Skin:       no visible deformities       well perfused       capillary refill brisk    Sensation:        normal sensation over shoulder and upper extremity       Radiology  Results for orders placed or performed in visit on 01/07/19   XR Shoulder Right G/E 3 Views    Narrative    XR RIGHT SHOULDER THREE OR MORE VIEWS  1/7/2019 3:50 PM     HISTORY: Chronic right shoulder pain.      COMPARISON: None      Impression    IMPRESSION: Mild degenerative changes in the acromioclavicular joint.  No evidence of fracture. A 6 mm metallic density projects over the  right lateral chest. This may be a small foreign body.    NICK PANTOJA MD     Large Joint Injection/Arthocentesis: R subacromial bursa  Date/Time: 1/30/2019 6:15 PM  Performed by: Dav Vee DO  Authorized by: Dav Vee DO      Indications:  Pain  Needle Size:  22 G  Guidance: ultrasound    Approach:  Posterolateral  Location:  Shoulder  Site:  R subacromial bursa  Medications:  40 mg triamcinolone 40 MG/ML; 3 mL ropivacaine 5 MG/ML  Outcome:  Tolerated well, no immediate complications  Procedure discussed: discussed risks, benefits, and alternatives    Consent Given by:  Patient  Prep: patient was prepped and draped in usual sterile fashion            Assessment:  1. Chronic right shoulder pain    2. Subacromial bursitis of right shoulder joint        Plan:  Discussed the assessment with the patient.  Follow up: prn based on clinical progress  XR images independently visualized and reviewed with patient today in clinic  Has AC joint OA with inferior osteophyte, joint not painful however  Osteophyte likely contributory to subacromial impingement and bursitis  US guided subacromial CSI today with good initial relief from anesthetic effect  PT options reviewed, HEP provided  Expectations and goals of CSI reviewed  Often 2-3 days for steroid effect, and can take up to two weeks for maximum effect  We discussed modified progressive pain-free activity as tolerated  Do not overuse in first two weeks if feeling better due to concern for vulnerability while steroid is working  Supportive care reviewed  All questions were answered today  Contact us with additional questions or concerns  Signs and sx of concern reviewed    Thanks very much for sending this nice gentleman to us, I will keep you updated with his progress      Dav Vee DO, CAMICHAEL  Primary Care Sports Medicine  [facility]           Disclaimer: This note consists of symbols derived from keyboarding, dictation and/or voice recognition software. As a result, there may be errors in the script that have gone undetected. Please consider this when interpreting information found in this chart.

## 2019-01-31 RX ORDER — ROPIVACAINE HYDROCHLORIDE 5 MG/ML
3 INJECTION, SOLUTION EPIDURAL; INFILTRATION; PERINEURAL
Status: DISCONTINUED | OUTPATIENT
Start: 2019-01-30 | End: 2019-10-04 | Stop reason: ALTCHOICE

## 2019-01-31 RX ORDER — TRIAMCINOLONE ACETONIDE 40 MG/ML
40 INJECTION, SUSPENSION INTRA-ARTICULAR; INTRAMUSCULAR
Status: DISCONTINUED | OUTPATIENT
Start: 2019-01-30 | End: 2019-10-04 | Stop reason: ALTCHOICE

## 2019-02-05 ENCOUNTER — MYC REFILL (OUTPATIENT)
Dept: FAMILY MEDICINE | Facility: CLINIC | Age: 64
End: 2019-02-05

## 2019-02-05 DIAGNOSIS — I10 HYPERTENSION GOAL BP (BLOOD PRESSURE) < 140/90: ICD-10-CM

## 2019-02-06 RX ORDER — METOPROLOL TARTRATE 100 MG
TABLET ORAL
Qty: 90 TABLET | Refills: 0 | Status: SHIPPED | OUTPATIENT
Start: 2019-02-06 | End: 2019-09-17

## 2019-02-06 NOTE — TELEPHONE ENCOUNTER
"Routing refill request to provider for review/approval because:  Elevated BP      Requested Prescriptions   Pending Prescriptions Disp Refills     metoprolol tartrate (LOPRESSOR) 100 MG tablet  Last Written Prescription Date:  1/7/19  Last Fill Quantity: 90,  # refills: 0   Last office visit: 1/7/2019 with prescribing provider:     Future Office Visit:     90 tablet 0     Sig: TAKE ONE-HALF BY MOUTH TWICE DAILY    Beta-Blockers Protocol Failed - 2/5/2019 11:59 AM       Failed - Blood pressure under 140/90 in past 12 months    BP Readings from Last 3 Encounters:   01/30/19 (!) 152/91   01/07/19 136/76   11/15/18 121/67                Passed - Patient is age 6 or older       Passed - Recent (12 mo) or future (30 days) visit within the authorizing provider's specialty    Patient had office visit in the last 12 months or has a visit in the next 30 days with authorizing provider or within the authorizing provider's specialty.  See \"Patient Info\" tab in inbasket, or \"Choose Columns\" in Meds & Orders section of the refill encounter.             Passed - Medication is active on med list        .ejs    "

## 2019-05-24 ENCOUNTER — TELEPHONE (OUTPATIENT)
Dept: FAMILY MEDICINE | Facility: CLINIC | Age: 64
End: 2019-05-24

## 2019-05-24 NOTE — LETTER
May 24, 2019          Sonny Armstrong,  [address] [facility] [address]        Dear Sonny Armstrong      Monitoring and managing your preventative and chronic health conditions are very important to us. Our records indicate that you have not scheduled for a nurse blood pressure check which was recommended by Dr. Padilla.      If you have received your health care elsewhere, please call the clinic so the information can be documented in your chart.    Please call [phone] or message us through your [employer] account to schedule an appointment or provide information for your chart.     Feel free to contact us if you have any questions or concerns!    I look forward to seeing you and working with you on your health care needs.     Sincerely,       Your [place] [facility] Team/Cathleen SOLORIO CMA ([facility])

## 2019-05-24 NOTE — TELEPHONE ENCOUNTER
Panel Management Review      Patient has the following on his problem list:     Hypertension   Last three blood pressure readings:  BP Readings from Last 3 Encounters:   01/30/19 (!) 152/91   01/07/19 136/76   11/15/18 121/67     Blood pressure: FAILED    HTN Guidelines:  Less than 140/90      Composite cancer screening  Chart review shows that this patient is due/due soon for the following None  Summary:    Patient is due/failing the following:   Diabetic exam, adv directives, phq2, physical    Action needed:   Patient needs office visit for nurse check up.    Type of outreach:    Sent letter.    Questions for provider review:    None                                                                                                                                    Cathleen SOLORIO CMA ([facility])       Chart routed to none .

## 2019-07-22 ENCOUNTER — MYC MEDICAL ADVICE (OUTPATIENT)
Dept: FAMILY MEDICINE | Facility: CLINIC | Age: 64
End: 2019-07-22

## 2019-07-22 DIAGNOSIS — T75.3XXA MOTION SICKNESS, INITIAL ENCOUNTER: Primary | ICD-10-CM

## 2019-07-22 RX ORDER — SCOLOPAMINE TRANSDERMAL SYSTEM 1 MG/1
1 PATCH, EXTENDED RELEASE TRANSDERMAL
Qty: 4 PATCH | Refills: 0 | Status: SHIPPED | OUTPATIENT
Start: 2019-07-22 | End: 2023-03-27

## 2019-07-24 ENCOUNTER — TELEPHONE (OUTPATIENT)
Dept: FAMILY MEDICINE | Facility: CLINIC | Age: 64
End: 2019-07-24

## 2019-07-24 NOTE — LETTER
8/1/2019     Sonny Armstrong  [address] [facility] [address]      Dear Sonny:    We have been trying to reach you about your Scopolamine Transdermal patches that are not covered under your insurance. Please pay out of pocket for these or you can buy Meclizine (Antivert) or Diphenhydramine (Benadryl) over the counter.     If you have any questions please give the clinic a call at [phone].       Sincerely,    Moreno Padilla MD  [address] [address]  Phone: [phone]

## 2019-07-24 NOTE — TELEPHONE ENCOUNTER
Meclizine [Antivert] is an over the counter nonprescription and so is diphenhydrAMINE (BENADRYL)       Health insurance  Won't cover these and the prescriptions are under 20 dollars anyway.    Patient has to understand , if he wishes for the convenience of using a patch he's got to be willing to be paying out of pocket for this medication     Moreno Padilla MD

## 2019-07-24 NOTE — TELEPHONE ENCOUNTER
Got message from pharmacy saying that Scopolamine Transdermal patches are not covered. The preferred alternative is Meclizine HCL, Diphenhydramine HCL. Please order if appropriate.  Mirian NIEVES CMA

## 2019-08-01 NOTE — TELEPHONE ENCOUNTER
Called patient and left message to call clinic back.     Letter sent due to multiple phone attempts.     Cathleen SOLORIO CMA ([facility])

## 2019-08-02 ENCOUNTER — TELEPHONE (OUTPATIENT)
Dept: FAMILY MEDICINE | Facility: CLINIC | Age: 64
End: 2019-08-02

## 2019-08-02 NOTE — TELEPHONE ENCOUNTER
Patient returning call stating he received multiple calls from us, informed him of the medication issues and he verbalized understanding also informed him that a letter was sent in the mail regarding this as there were multiple failed attempts.   Patient wanted me to inform provider that he has been taking his BP twice daily and the range is around 136/72 with a pulse ranging around 82. Patient is wondering if PCP wants him to come in for his BP or if this is okay.   Please advise.   Thank you  LS

## 2019-08-02 NOTE — TELEPHONE ENCOUNTER
Lets hope this is the final call. Blood pressure readings ok. Recheck 6 months ' next January 2020    Moreno Padilla MD

## 2019-08-05 NOTE — TELEPHONE ENCOUNTER
Called patient and left message that patient does not need to be seen until January 2020, and to call with any questions.       Cathleen SOLORIO CMA ([facility])

## 2019-09-17 DIAGNOSIS — I10 HYPERTENSION GOAL BP (BLOOD PRESSURE) < 140/90: ICD-10-CM

## 2019-09-20 RX ORDER — METOPROLOL TARTRATE 100 MG
TABLET ORAL
Qty: 90 TABLET | Refills: 0 | Status: SHIPPED | OUTPATIENT
Start: 2019-09-20 | End: 2020-01-02

## 2019-09-20 NOTE — TELEPHONE ENCOUNTER
"Routing refill request to provider for review/approval because:  Labs out of range:  BP    Requested Prescriptions   Pending Prescriptions Disp Refills     metoprolol tartrate (LOPRESSOR) 100 MG tablet [Pharmacy Med Name: METOPROLOL TARTRATE 100MG TABLETS] 90 tablet 0     Sig: TAKE 1/2 TABLET BY MOUTH TWICE DAILY       Beta-Blockers Protocol Failed - 9/18/2019  6:45 AM        Failed - Blood pressure under 140/90 in past 12 months     BP Readings from Last 3 Encounters:   01/30/19 (!) 152/91   01/07/19 136/76   11/15/18 121/67                 Passed - Patient is age 6 or older        Passed - Recent (12 mo) or future (30 days) visit within the authorizing provider's specialty     Patient had office visit in the last 12 months or has a visit in the next 30 days with authorizing provider or within the authorizing provider's specialty.  See \"Patient Info\" tab in inbasket, or \"Choose Columns\" in Meds & Orders section of the refill encounter.              Passed - Medication is active on med list        Iris Lehman RN  "

## 2019-10-04 ENCOUNTER — OFFICE VISIT (OUTPATIENT)
Dept: ORTHOPEDICS | Facility: CLINIC | Age: 64
End: 2019-10-04
Payer: COMMERCIAL

## 2019-10-04 VITALS
BODY MASS INDEX: 31.84 KG/M2 | DIASTOLIC BLOOD PRESSURE: 80 MMHG | HEIGHT: 69 IN | WEIGHT: 215 LBS | SYSTOLIC BLOOD PRESSURE: 148 MMHG

## 2019-10-04 DIAGNOSIS — M25.511 CHRONIC RIGHT SHOULDER PAIN: ICD-10-CM

## 2019-10-04 DIAGNOSIS — G89.29 CHRONIC RIGHT SHOULDER PAIN: ICD-10-CM

## 2019-10-04 DIAGNOSIS — M65.341 TRIGGER RING FINGER OF RIGHT HAND: Primary | ICD-10-CM

## 2019-10-04 DIAGNOSIS — M75.51 SUBACROMIAL BURSITIS OF RIGHT SHOULDER JOINT: ICD-10-CM

## 2019-10-04 PROCEDURE — 20611 DRAIN/INJ JOINT/BURSA W/US: CPT | Mod: RT | Performed by: FAMILY MEDICINE

## 2019-10-04 PROCEDURE — 99214 OFFICE O/P EST MOD 30 MIN: CPT | Mod: 25 | Performed by: FAMILY MEDICINE

## 2019-10-04 RX ORDER — TRIAMCINOLONE ACETONIDE 40 MG/ML
40 INJECTION, SUSPENSION INTRA-ARTICULAR; INTRAMUSCULAR
Status: SHIPPED | OUTPATIENT
Start: 2019-10-04

## 2019-10-04 RX ORDER — ROPIVACAINE HYDROCHLORIDE 5 MG/ML
3 INJECTION, SOLUTION EPIDURAL; INFILTRATION; PERINEURAL
Status: SHIPPED | OUTPATIENT
Start: 2019-10-04

## 2019-10-04 RX ADMIN — TRIAMCINOLONE ACETONIDE 40 MG: 40 INJECTION, SUSPENSION INTRA-ARTICULAR; INTRAMUSCULAR at 14:20

## 2019-10-04 RX ADMIN — ROPIVACAINE HYDROCHLORIDE 3 ML: 5 INJECTION, SOLUTION EPIDURAL; INFILTRATION; PERINEURAL at 14:20

## 2019-10-04 ASSESSMENT — MIFFLIN-ST. JEOR: SCORE: 1757.67

## 2019-10-04 NOTE — LETTER
10/4/2019         RE: Sonny Armstrong  [address] [facility] [address]        Dear Colleague,    Thank you for referring your patient, Sonny Armstrong, to the [facility]. Please see a copy of my visit note below.    Sonny Armstrong  :  1955  DOS: 10/04/19  MRN: [id number]    Sports Medicine Clinic Visit    PCP: Moreno Padilla    Sonny Armstrong is a 63 year old Right hand dominant male who is seen in consultation at the request of  Moreno Padilla M.D. presenting with chronic right shoulder pain.    Injury: Gradual onset of right shoulder pain over the last ~ 8 - 9 months.  Pain located over right deep lateral and posterior shoulder, nonradiating.  Additional Features:  Positive: swelling, grinding and locking (morning stiffness).  Symptoms are better with Rest and movement throughout the day.  Symptoms are worse with: lying on right shoulder, shoulder flexion/abduction, lifting, reaching behind back.  Other evaluation and/or treatments so far consists of: Ice, Ibuprofen, Rest and PCP.  Recent imaging completed: X-rays completed 19.  Prior History of related problems: H/o right shoulder calcific tendinitis ~ 20+ years ago that improved following steroid injection.    Social History: retired    Interim History - 2019  Since last visit on 19 patient has moderate-severe right shoulder pain over the last ~ 3 months.  Right subacromial steroid injection completed on  provided good relief for ~ 6 months.  Pain located over posterior shoulder again today.  No new injury in the interim.    Second complaint follow up for triggering of right ring finger over the past ~ 10 months.  Trigger finger injection completed 2018 provided excellent for ~ 6 months.  Patient notes decreased  strength and AROM over the past 9 - 10 months.      Review of Systems  Musculoskeletal: as above  Remainder of review of systems is negative including  "constitutional, CV, pulmonary, GI, Skin and Neurologic except as noted in HPI or medical history.    Past Medical History:   Diagnosis Date     Hyperlipidemia LDL goal <130 11/16/2010     Hypertension goal BP (blood pressure) < 140/90 11/16/2010     Hypothyroidism 11/16/2010     BURDICK (nonalcoholic steatohepatitis) 2007    had liver biopsy     Past Surgical History:   Procedure Laterality Date     COLONOSCOPY WITH CO2 INSUFFLATION N/A 11/15/2018    Procedure: COLONOSCOPY WITH CO2 INSUFFLATION;  Surgeon: Timi Ballard MD;  Location: [facility] OR     TONSILLECTOMY  1971     Objective  BP (!) 148/80   Ht 1.74 m (5' 8.5\")   Wt 97.5 kg (215 lb)   BMI 32.22 kg/m       General: healthy, alert and in no distress    HEENT: no scleral icterus or conjunctival erythema   Skin: no suspicious lesions or rash. No jaundice.   CV: regular rhythm by palpation, 2+ distal pulses, no pedal edema    Resp: normal respiratory effort without conversational dyspnea   Psych: normal mood and affect    Gait: nonantalgic, appropriate coordination and balance   Neuro: normal light touch sensory exam of the extremities. Motor strength as noted below     Right Shoulder exam    ROM:        Full active and passive ROM with flexion, extension, abduction, internal and external rotation.       asymmetric scapular motion on R       Painful terminal flexion and abduction, mildly in ER    Tender:        subacromial space       Lateral deltoid    Non Tender:       remainder of shoulder       sternoclavicular joint       acromioclavicular joint       posterior shoulder       periscapular region    Strength:        abduction 4/5       internal rotation 4/5       external rotation 5/5       adduction 5/5    Impingement testing:        positive (+) Neer       positive (+) Borrero       positive (+) empty can       neg (-) crossover       neg (-) O'brando       neg (-) crank    Stability testing:       neg (-) relocation       neg (-) anterior glide       " neg (-) sulcus sign    Skin:       no visible deformities       well perfused       capillary refill brisk    Sensation:        normal sensation over shoulder and upper extremity     Right hand exam  Inspection:4th Finger:  slight swelling, over MCP, flexor tendon nodule  Tender: 4th Finger:   Minimal MCP joint, triggering, A1 Pulley  Non-tender: remainder of hand  Range of Motion 4th Finger:   Painful full extension, triggering of A1 pulley with flexion  Strength: full strength    Radiology  Results for orders placed or performed in visit on 01/07/19   XR Shoulder Right G/E 3 Views    Narrative    XR RIGHT SHOULDER THREE OR MORE VIEWS  1/7/2019 3:50 PM     HISTORY: Chronic right shoulder pain.      COMPARISON: None      Impression    IMPRESSION: Mild degenerative changes in the acromioclavicular joint.  No evidence of fracture. A 6 mm metallic density projects over the  right lateral chest. This may be a small foreign body.    NICK PANTOJA MD     Large Joint Injection/Arthocentesis: R subacromial bursa  Date/Time: 10/4/2019 2:20 PM  Performed by: Dav Vee DO  Authorized by: Dav Vee DO     Indications:  Pain  Needle Size:  22 G  Guidance: ultrasound    Approach:  Posterolateral  Location:  Shoulder      Site:  R subacromial bursa  Medications:  40 mg triamcinolone 40 MG/ML; 3 mL ropivacaine 5 MG/ML  Outcome:  Tolerated well, no immediate complications  Procedure discussed: discussed risks, benefits, and alternatives    Consent Given by:  Patient  Timeout: timeout called immediately prior to procedure    Prep: patient was prepped and draped in usual sterile fashion        Assessment:  1. Trigger ring finger of right hand    2. Subacromial bursitis of right shoulder joint    3. Chronic right shoulder pain        Plan:  Discussed the assessment with the patient.  Follow up: prn based on clinical progress  XR images independently visualized and reviewed with patient again today in  clinic  Has AC joint OA with inferior osteophyte, joint not painful however  Osteophyte likely contributory to subacromial impingement and bursitis  US guided subacromial CSI repeated today with good initial relief from anesthetic effect again  PT options reviewed, HEP provided  Referral provided for Dr. Wheatley for trigger finger consultation  They are interested in seeing him given that he does this as an office procedure in the appropriate clinical scenario  We deferred an injection to his trigger finger today given that he does not have significant pain or inflammation currently  His problem is more functional, it was heavy gripping he will lock and have a difficult time straightening his finger, and then be sore for a couple of days  Expectations and goals of CSI reviewed  Often 2-3 days for steroid effect, and can take up to two weeks for maximum effect  We discussed modified progressive pain-free activity as tolerated  Do not overuse in first two weeks if feeling better due to concern for vulnerability while steroid is working  Supportive care reviewed  All questions were answered today  Contact us with additional questions or concerns  Signs and sx of concern reviewed      Dav Vee DO, LOULOU  Primary Care Sports Medicine  [facility]           Disclaimer: This note consists of symbols derived from keyboarding, dictation and/or voice recognition software. As a result, there may be errors in the script that have gone undetected. Please consider this when interpreting information found in this chart.    Again, thank you for allowing me to participate in the care of your patient.        Sincerely,        Dav Vee DO

## 2019-10-04 NOTE — PROGRESS NOTES
Sonny Armstrong  :  1955  DOS: 10/04/19  MRN: [id number]    Sports Medicine Clinic Visit    PCP: Moreno Padilla    Sonny Armstrong is a 63 year old Right hand dominant male who is seen in consultation at the request of  Moreno Padilla M.D. presenting with chronic right shoulder pain.    Injury: Gradual onset of right shoulder pain over the last ~ 8 - 9 months.  Pain located over right deep lateral and posterior shoulder, nonradiating.  Additional Features:  Positive: swelling, grinding and locking (morning stiffness).  Symptoms are better with Rest and movement throughout the day.  Symptoms are worse with: lying on right shoulder, shoulder flexion/abduction, lifting, reaching behind back.  Other evaluation and/or treatments so far consists of: Ice, Ibuprofen, Rest and PCP.  Recent imaging completed: X-rays completed 19.  Prior History of related problems: H/o right shoulder calcific tendinitis ~ 20+ years ago that improved following steroid injection.    Social History: retired    Interim History - 2019  Since last visit on 19 patient has moderate-severe right shoulder pain over the last ~ 3 months.  Right subacromial steroid injection completed on  provided good relief for ~ 6 months.  Pain located over posterior shoulder again today.  No new injury in the interim.    Second complaint follow up for triggering of right ring finger over the past ~ 10 months.  Trigger finger injection completed 2018 provided excellent for ~ 6 months.  Patient notes decreased  strength and AROM over the past 9 - 10 months.      Review of Systems  Musculoskeletal: as above  Remainder of review of systems is negative including constitutional, CV, pulmonary, GI, Skin and Neurologic except as noted in HPI or medical history.    Past Medical History:   Diagnosis Date     Hyperlipidemia LDL goal <130 2010     Hypertension goal BP (blood pressure) < 140/90 2010     Hypothyroidism 2010  "    BURDCIK (nonalcoholic steatohepatitis) 2007    had liver biopsy     Past Surgical History:   Procedure Laterality Date     COLONOSCOPY WITH CO2 INSUFFLATION N/A 11/15/2018    Procedure: COLONOSCOPY WITH CO2 INSUFFLATION;  Surgeon: Timi Ballard MD;  Location:  OR     TONSILLECTOMY  1971     Objective  BP (!) 148/80   Ht 1.74 m (5' 8.5\")   Wt 97.5 kg (215 lb)   BMI 32.22 kg/m      General: healthy, alert and in no distress    HEENT: no scleral icterus or conjunctival erythema   Skin: no suspicious lesions or rash. No jaundice.   CV: regular rhythm by palpation, 2+ distal pulses, no pedal edema    Resp: normal respiratory effort without conversational dyspnea   Psych: normal mood and affect    Gait: nonantalgic, appropriate coordination and balance   Neuro: normal light touch sensory exam of the extremities. Motor strength as noted below     Right Shoulder exam    ROM:        Full active and passive ROM with flexion, extension, abduction, internal and external rotation.       asymmetric scapular motion on R       Painful terminal flexion and abduction, mildly in ER    Tender:        subacromial space       Lateral deltoid    Non Tender:       remainder of shoulder       sternoclavicular joint       acromioclavicular joint       posterior shoulder       periscapular region    Strength:        abduction 4/5       internal rotation 4/5       external rotation 5/5       adduction 5/5    Impingement testing:        positive (+) Neer       positive (+) Borrero       positive (+) empty can       neg (-) crossover       neg (-) O'brando       neg (-) crank    Stability testing:       neg (-) relocation       neg (-) anterior glide       neg (-) sulcus sign    Skin:       no visible deformities       well perfused       capillary refill brisk    Sensation:        normal sensation over shoulder and upper extremity     Right hand exam  Inspection:4th Finger:  slight swelling, over MCP, flexor tendon " nodule  Tender: 4th Finger:   Minimal MCP joint, triggering, A1 Pulley  Non-tender: remainder of hand  Range of Motion 4th Finger:   Painful full extension, triggering of A1 pulley with flexion  Strength: full strength    Radiology  Results for orders placed or performed in visit on 01/07/19   XR Shoulder Right G/E 3 Views    Narrative    XR RIGHT SHOULDER THREE OR MORE VIEWS  1/7/2019 3:50 PM     HISTORY: Chronic right shoulder pain.      COMPARISON: None      Impression    IMPRESSION: Mild degenerative changes in the acromioclavicular joint.  No evidence of fracture. A 6 mm metallic density projects over the  right lateral chest. This may be a small foreign body.    NICK PANTOJA MD     Large Joint Injection/Arthocentesis: R subacromial bursa  Date/Time: 10/4/2019 2:20 PM  Performed by: Dav Vee DO  Authorized by: Dav Vee DO     Indications:  Pain  Needle Size:  22 G  Guidance: ultrasound    Approach:  Posterolateral  Location:  Shoulder      Site:  R subacromial bursa  Medications:  40 mg triamcinolone 40 MG/ML; 3 mL ropivacaine 5 MG/ML  Outcome:  Tolerated well, no immediate complications  Procedure discussed: discussed risks, benefits, and alternatives    Consent Given by:  Patient  Timeout: timeout called immediately prior to procedure    Prep: patient was prepped and draped in usual sterile fashion        Assessment:  1. Trigger ring finger of right hand    2. Subacromial bursitis of right shoulder joint    3. Chronic right shoulder pain        Plan:  Discussed the assessment with the patient.  Follow up: prn based on clinical progress  XR images independently visualized and reviewed with patient again today in clinic  Has AC joint OA with inferior osteophyte, joint not painful however  Osteophyte likely contributory to subacromial impingement and bursitis  US guided subacromial CSI repeated today with good initial relief from anesthetic effect again  PT options reviewed, HEP  provided  Referral provided for Dr. Wheatley for trigger finger consultation  They are interested in seeing him given that he does this as an office procedure in the appropriate clinical scenario  We deferred an injection to his trigger finger today given that he does not have significant pain or inflammation currently  His problem is more functional, it was heavy gripping he will lock and have a difficult time straightening his finger, and then be sore for a couple of days  Expectations and goals of CSI reviewed  Often 2-3 days for steroid effect, and can take up to two weeks for maximum effect  We discussed modified progressive pain-free activity as tolerated  Do not overuse in first two weeks if feeling better due to concern for vulnerability while steroid is working  Supportive care reviewed  All questions were answered today  Contact us with additional questions or concerns  Signs and sx of concern reviewed      Dav Vee DO, LOULOU  Primary Care Sports Medicine  [facility]           Disclaimer: This note consists of symbols derived from keyboarding, dictation and/or voice recognition software. As a result, there may be errors in the script that have gone undetected. Please consider this when interpreting information found in this chart.

## 2019-12-30 DIAGNOSIS — I10 HYPERTENSION GOAL BP (BLOOD PRESSURE) < 140/90: ICD-10-CM

## 2019-12-30 DIAGNOSIS — E03.9 ACQUIRED HYPOTHYROIDISM: ICD-10-CM

## 2020-01-02 RX ORDER — METOPROLOL TARTRATE 100 MG
TABLET ORAL
Qty: 45 TABLET | Refills: 0 | Status: SHIPPED | OUTPATIENT
Start: 2020-01-02 | End: 2020-01-27

## 2020-01-02 RX ORDER — HYDROCHLOROTHIAZIDE 12.5 MG/1
CAPSULE ORAL
Qty: 90 CAPSULE | Refills: 0 | Status: SHIPPED | OUTPATIENT
Start: 2020-01-02 | End: 2020-01-27

## 2020-01-02 RX ORDER — LEVOTHYROXINE SODIUM 75 UG/1
TABLET ORAL
Qty: 90 TABLET | Refills: 0 | Status: SHIPPED | OUTPATIENT
Start: 2020-01-02 | End: 2020-01-27

## 2020-01-27 ENCOUNTER — OFFICE VISIT (OUTPATIENT)
Dept: INTERNAL MEDICINE | Facility: CLINIC | Age: 65
End: 2020-01-27
Payer: COMMERCIAL

## 2020-01-27 VITALS
TEMPERATURE: 97.7 F | HEART RATE: 85 BPM | DIASTOLIC BLOOD PRESSURE: 64 MMHG | HEIGHT: 68 IN | RESPIRATION RATE: 18 BRPM | BODY MASS INDEX: 28.34 KG/M2 | SYSTOLIC BLOOD PRESSURE: 116 MMHG | WEIGHT: 187 LBS | OXYGEN SATURATION: 94 %

## 2020-01-27 DIAGNOSIS — I10 HYPERTENSION GOAL BP (BLOOD PRESSURE) < 140/90: ICD-10-CM

## 2020-01-27 DIAGNOSIS — E66.01 MORBID OBESITY DUE TO EXCESS CALORIES (H): ICD-10-CM

## 2020-01-27 DIAGNOSIS — E03.9 ACQUIRED HYPOTHYROIDISM: ICD-10-CM

## 2020-01-27 DIAGNOSIS — Z12.5 SCREENING FOR PROSTATE CANCER: ICD-10-CM

## 2020-01-27 DIAGNOSIS — E78.5 HYPERLIPIDEMIA LDL GOAL <130: ICD-10-CM

## 2020-01-27 DIAGNOSIS — Z00.00 ROUTINE HISTORY AND PHYSICAL EXAMINATION OF ADULT: Primary | ICD-10-CM

## 2020-01-27 LAB
ANION GAP SERPL CALCULATED.3IONS-SCNC: 4 MMOL/L (ref 3–14)
BUN SERPL-MCNC: 23 MG/DL (ref 7–30)
CALCIUM SERPL-MCNC: 9.6 MG/DL (ref 8.5–10.1)
CHLORIDE SERPL-SCNC: 103 MMOL/L (ref 94–109)
CHOLEST SERPL-MCNC: 238 MG/DL
CO2 SERPL-SCNC: 31 MMOL/L (ref 20–32)
CREAT SERPL-MCNC: 0.88 MG/DL (ref 0.66–1.25)
GFR SERPL CREATININE-BSD FRML MDRD: >90 ML/MIN/{1.73_M2}
GLUCOSE SERPL-MCNC: 97 MG/DL (ref 70–99)
HDLC SERPL-MCNC: 40 MG/DL
HGB BLD-MCNC: 15.5 G/DL (ref 13.3–17.7)
LDLC SERPL CALC-MCNC: 168 MG/DL
NONHDLC SERPL-MCNC: 198 MG/DL
POTASSIUM SERPL-SCNC: 4.4 MMOL/L (ref 3.4–5.3)
PSA SERPL-ACNC: 0.93 UG/L (ref 0–4)
SODIUM SERPL-SCNC: 138 MMOL/L (ref 133–144)
T4 FREE SERPL-MCNC: 1.07 NG/DL (ref 0.76–1.46)
TRIGL SERPL-MCNC: 151 MG/DL
TSH SERPL DL<=0.005 MIU/L-ACNC: 8.98 MU/L (ref 0.4–4)

## 2020-01-27 PROCEDURE — 99396 PREV VISIT EST AGE 40-64: CPT | Performed by: INTERNAL MEDICINE

## 2020-01-27 PROCEDURE — 84443 ASSAY THYROID STIM HORMONE: CPT | Performed by: INTERNAL MEDICINE

## 2020-01-27 PROCEDURE — 36415 COLL VENOUS BLD VENIPUNCTURE: CPT | Performed by: INTERNAL MEDICINE

## 2020-01-27 PROCEDURE — G0103 PSA SCREENING: HCPCS | Performed by: INTERNAL MEDICINE

## 2020-01-27 PROCEDURE — 80048 BASIC METABOLIC PNL TOTAL CA: CPT | Performed by: INTERNAL MEDICINE

## 2020-01-27 PROCEDURE — 84439 ASSAY OF FREE THYROXINE: CPT | Performed by: INTERNAL MEDICINE

## 2020-01-27 PROCEDURE — 80061 LIPID PANEL: CPT | Performed by: INTERNAL MEDICINE

## 2020-01-27 PROCEDURE — 85018 HEMOGLOBIN: CPT | Performed by: INTERNAL MEDICINE

## 2020-01-27 RX ORDER — HYDROCHLOROTHIAZIDE 12.5 MG/1
CAPSULE ORAL
Qty: 90 CAPSULE | Refills: 3 | Status: SHIPPED | OUTPATIENT
Start: 2020-01-27 | End: 2021-03-25

## 2020-01-27 RX ORDER — PRAVASTATIN SODIUM 20 MG
TABLET ORAL
Qty: 48 TABLET | Refills: 3 | Status: SHIPPED | OUTPATIENT
Start: 2020-01-27 | End: 2020-08-05

## 2020-01-27 RX ORDER — LEVOTHYROXINE SODIUM 75 UG/1
TABLET ORAL
Qty: 90 TABLET | Refills: 3 | Status: SHIPPED | OUTPATIENT
Start: 2020-01-27 | End: 2021-02-04

## 2020-01-27 RX ORDER — METOPROLOL TARTRATE 100 MG
50 TABLET ORAL 2 TIMES DAILY
Qty: 90 TABLET | Refills: 3 | Status: SHIPPED | OUTPATIENT
Start: 2020-01-27 | End: 2021-03-25

## 2020-01-27 ASSESSMENT — ENCOUNTER SYMPTOMS
DIARRHEA: 0
CONSTIPATION: 0
FEVER: 0
COUGH: 0
HEMATOCHEZIA: 0
EYE PAIN: 0
ABDOMINAL PAIN: 0
NERVOUS/ANXIOUS: 0
HEMATURIA: 0
FREQUENCY: 0
CHILLS: 0
DIZZINESS: 0

## 2020-01-27 ASSESSMENT — MIFFLIN-ST. JEOR: SCORE: 1617.73

## 2020-01-29 ENCOUNTER — TELEPHONE (OUTPATIENT)
Dept: INTERNAL MEDICINE | Facility: CLINIC | Age: 65
End: 2020-01-29

## 2020-01-29 NOTE — TELEPHONE ENCOUNTER
Called patient. LVM to call RN hotline ([phone]).      Notes recorded by Moreno Padilla MD on 1/28/2020 at 10:19 PM CST  These labs show us the following things ;     1. There is emerging evidence of thyroid disease. it's just not yet to a level that treatment with levothyroxine is appropriate however your TSH ( thyroid test ) is higher then before and I recommend a recheck of this in 6 months. No appointment with me necessary, just the labwork is needed.    2. Your cholesterol panel numbers show continued need for treatment with pravastatin as you are doing.    The rest of these laboratory studies are all fine.    Please let me know if you have any questions for me about all of these lab tests     Moreno Padilla MD

## 2020-01-29 NOTE — TELEPHONE ENCOUNTER
Patient gave verbal consent to speak with his wife, Sravanthi.  Updated her on result    Patient is already on treatment for hypothyroidism for many years and is taking levothyroxine 75 mcg daily  Please clarify    Jeison Carrion RN

## 2020-01-29 NOTE — TELEPHONE ENCOUNTER
I am so sorry for a results note that was inaccurate. I am doing sometimes these results notes at home in the later evening and can occasionally just plain get something wrong. Fortunately I am part of a treatment team and these errors are swiftly corrected including by the patient if there's ever a question !    Of course you are correct and so lets back up for a moment.    The recent TSH ( thyroid test ) is a tiny bit up    TSH   Date Value Ref Range Status   01/27/2020 8.98 (H) 0.40 - 4.00 mU/L Final     But is within acceptable limits given the fact that we are taking levothyroxine and the Total thyroxine (T4) is in the right place.    T4 Free   Date Value Ref Range Status   01/27/2020 1.07 0.76 - 1.46 ng/dL Final     So lets continue current plan of care  And recheck in one year . Please let me know if patient / spouse has any additional questions for me     Moreno Padilla MD

## 2020-02-04 NOTE — TELEPHONE ENCOUNTER
3rd attempt. Called patient and spoke with spouse. Notified her of provider message. She verbalized understanding, states that the patient did  a refill of his levothyroxine medication. She will relay message to patient.

## 2020-03-01 ENCOUNTER — HEALTH MAINTENANCE LETTER (OUTPATIENT)
Age: 65
End: 2020-03-01

## 2020-05-27 ENCOUNTER — MYC REFILL (OUTPATIENT)
Dept: OTHER | Age: 65
End: 2020-05-27

## 2020-05-27 DIAGNOSIS — E78.5 HYPERLIPIDEMIA LDL GOAL <130: ICD-10-CM

## 2020-05-27 DIAGNOSIS — H57.9 ITCHY EYES: ICD-10-CM

## 2020-05-27 RX ORDER — OLOPATADINE HYDROCHLORIDE 1 MG/ML
1 SOLUTION/ DROPS OPHTHALMIC 2 TIMES DAILY
Qty: 5 ML | Refills: 1 | Status: CANCELLED | OUTPATIENT
Start: 2020-05-27

## 2020-05-28 RX ORDER — PRAVASTATIN SODIUM 20 MG
TABLET ORAL
Qty: 48 TABLET | Refills: 3 | OUTPATIENT
Start: 2020-05-28

## 2020-05-28 NOTE — TELEPHONE ENCOUNTER
Duplicate, 1st request.    pravastatin (PRAVACHOL) 20 MG tablet  48 tablet  3  1/27/2020   No    Sig: Take 1 tab (20mg) 4 days per week    Sent to pharmacy as: pravastatin (PRAVACHOL) 20 MG tablet    Class: E-Prescribe    Order: [id number]    E-Prescribing Status: Receipt confirmed by pharmacy (1/27/2020  3:13 PM CST)      Merary RUSS CMA ([facility])

## 2020-08-03 DIAGNOSIS — E78.5 HYPERLIPIDEMIA LDL GOAL <130: ICD-10-CM

## 2020-08-03 NOTE — TELEPHONE ENCOUNTER
Reason for call:  Medication   If this is a refill request, has the caller requested the refill from the pharmacy already? Yes  Will the patient be using a [facility]? No  Name of the pharmacy and phone number for the current request: [facility] [phone]    Name of the medication requested: Pravastatin     Other request: n/a     Phone number to reach patient:  Other phone number:  [phone]    Best Time:  Any     Can we leave a detailed message on this number?  YES    Travel screening: Not Applicable

## 2020-08-04 NOTE — TELEPHONE ENCOUNTER
Receive fax from pharmacy, stating patient stated that he is taking one tab every day now, if true send in new script for this.       Cathleen SOLORIO CMA ([facility])

## 2020-08-05 RX ORDER — PRAVASTATIN SODIUM 20 MG
TABLET ORAL
Qty: 90 TABLET | Refills: 1 | Status: SHIPPED | OUTPATIENT
Start: 2020-08-05 | End: 2021-02-03

## 2020-08-05 NOTE — TELEPHONE ENCOUNTER
Routing refill request to provider for review/approval because:  Dosing instructions differ from prescription sent 01/27/2020.  Pt reports taking daily. Sig 01/27 is for 1 tablet 4 days per week.

## 2020-08-26 ENCOUNTER — MYC MEDICAL ADVICE (OUTPATIENT)
Dept: INTERNAL MEDICINE | Facility: CLINIC | Age: 65
End: 2020-08-26

## 2020-08-26 NOTE — TELEPHONE ENCOUNTER
Lab Results   Component Value Date    CHOL 238 01/27/2020     Lab Results   Component Value Date    HDL 40 01/27/2020     Lab Results   Component Value Date     01/27/2020     Lab Results   Component Value Date    TRIG 151 01/27/2020     Lab Results   Component Value Date    CHOLHDLRATIO 4.3 08/04/2015

## 2020-08-29 ENCOUNTER — OFFICE VISIT (OUTPATIENT)
Dept: ORTHOPEDICS | Facility: CLINIC | Age: 65
End: 2020-08-29
Payer: COMMERCIAL

## 2020-08-29 VITALS
DIASTOLIC BLOOD PRESSURE: 82 MMHG | BODY MASS INDEX: 24.71 KG/M2 | WEIGHT: 163 LBS | HEIGHT: 68 IN | SYSTOLIC BLOOD PRESSURE: 147 MMHG

## 2020-08-29 DIAGNOSIS — M25.521 RIGHT ELBOW PAIN: Primary | ICD-10-CM

## 2020-08-29 DIAGNOSIS — M77.11 LATERAL EPICONDYLITIS OF RIGHT ELBOW: ICD-10-CM

## 2020-08-29 PROCEDURE — 99214 OFFICE O/P EST MOD 30 MIN: CPT | Performed by: FAMILY MEDICINE

## 2020-08-29 ASSESSMENT — MIFFLIN-ST. JEOR: SCORE: 1508.86

## 2020-08-29 NOTE — PROGRESS NOTES
"Sonny Armstrong  :  1955  DOS: 2020  MRN: [id number]    Sports Medicine Clinic Visit    PCP: Moreno Padilla    Sonny Armstrong is a 64 year old Right hand dominant male who is seen as a self referral presenting with acute right elbow pain.    Injury: Gradual onset of right elbow pain that started after doing several \"projects\" around his home over the past ~ 3 months.  Pain located over right lateral elbow, proximal wrist extensors, nonradiating.  Additional Features:  Positive: swelling and weakness.  Symptoms are better with Ibuprofen, Rest and counter-force strap.  Symptoms are worse with: lifting, gripping/grasping, using hammer.  Other evaluation and/or treatments so far consists of: Ice, Ibuprofen, Rest and counter-force strap.  Recent imaging completed: No recent imaging completed.  Prior History of related problems: none    Social History: retired    Review of Systems  Musculoskeletal: as above  Remainder of review of systems is negative including constitutional, CV, pulmonary, GI, Skin and Neurologic except as noted in HPI or medical history.    Past Medical History:   Diagnosis Date     Hyperlipidemia LDL goal <130 2010     Hypertension goal BP (blood pressure) < 140/90 2010     Hypothyroidism 2010     BURDICK (nonalcoholic steatohepatitis)     had liver biopsy     Past Surgical History:   Procedure Laterality Date     COLONOSCOPY WITH CO2 INSUFFLATION N/A 11/15/2018    Procedure: COLONOSCOPY WITH CO2 INSUFFLATION;  Surgeon: Timi Ballard MD;  Location: [facility] OR     TONSILLECTOMY       Family History   Problem Relation Age of Onset     Heart Disease Father      Diabetes Maternal Grandfather      Thyroid Disease Daughter          Objective  BP (!) 147/82   Ht 1.727 m (5' 8\")   Wt 73.9 kg (163 lb)   BMI 24.78 kg/m        General: healthy, alert and in no distress      HEENT: no scleral icterus or conjunctival erythema     Skin: no suspicious lesions or rash. " No jaundice.     CV: regular rhythm by palpation, 2+ distal pulses, no pedal edema      Resp: normal respiratory effort without conversational dyspnea     Psych: normal mood and affect      Gait: nonantalgic, appropriate coordination and balance     Neuro: normal light touch sensory exam of the extremities. Motor strength as noted below       R Elbow exam  Inspection: no swelling  Tender: lateral epicondyle and common extensor tendon  Non-tender: medial epicondyle, common flexor tendon, flexor muscle of forearm, supracondylar notch, olecranon bursa, distal bicep tendon and radial head/neck  Range of Motion: all normal  Strength: elbow strength full and pain with resisted wrist extension and supination  Special tests: normal stability, normal valgus stress, normal varus stress, ulnar Tinel's negative, no pain with resisted middle finger extension, no pain with resisted wrist flexion:       Radiology:  No imaging needed today    Assessment:  1. Right elbow pain    2. Lateral epicondylitis of right elbow        Plan:  Discussed the assessment with the patient.  prn  Supportive and conservative care strategies reviewed  Ergonomics considerations reviewed, consider requesting ergonomics eval  Wrist brace use strategies reviewed, as well as counterforce strap  Use of short 1-2 wk course of NSAIDs reviewed, dosing and precautions reviewed if utilized  Consider voltaren gel for decreasing local inflammation  Consideration of CSI in future but hope to avoid  Tenex and PRP also an option based on shorter term improvement  We discussed modified progressive pain-free activity as tolerated  Home handouts provided and supportive care reviewed  All questions were answered today  Contact us with additional questions or concerns  Signs and sx of concern reviewed      Dav Vee DO, CAMICHAEL  Primary Care Sports Medicine  [facility]     Time spent in one-on-one evaluation and discussion with patient regarding  nature of problem, course, prior treatments, and therapeutic options, at least 50% of which was spent in counseling and coordination of care: 25 minutes      Disclaimer: This note consists of symbols derived from keyboarding, dictation and/or voice recognition software. As a result, there may be errors in the script that have gone undetected. Please consider this when interpreting information found in this chart.

## 2020-08-29 NOTE — LETTER
"    2020         RE: Sonny Armstrong  [address] [facility] [address]        Dear Colleague,    Thank you for referring your patient, Sonny Armstrong, to the [facility] [place]. Please see a copy of my visit note below.    Sonny Armstrong  :  1955  DOS: 2020  MRN: [id number]    Sports Medicine Clinic Visit    PCP: Moreno Padilla    Sonny Armstrong is a 64 year old Right hand dominant male who is seen as a self referral presenting with acute right elbow pain.    Injury: Gradual onset of right elbow pain that started after doing several \"projects\" around his home over the past ~ 3 months.  Pain located over right lateral elbow, proximal wrist extensors, nonradiating.  Additional Features:  Positive: swelling and weakness.  Symptoms are better with Ibuprofen, Rest and counter-force strap.  Symptoms are worse with: lifting, gripping/grasping, using hammer.  Other evaluation and/or treatments so far consists of: Ice, Ibuprofen, Rest and counter-force strap.  Recent imaging completed: No recent imaging completed.  Prior History of related problems: none    Social History: retired    Review of Systems  Musculoskeletal: as above  Remainder of review of systems is negative including constitutional, CV, pulmonary, GI, Skin and Neurologic except as noted in HPI or medical history.    Past Medical History:   Diagnosis Date     Hyperlipidemia LDL goal <130 2010     Hypertension goal BP (blood pressure) < 140/90 2010     Hypothyroidism 2010     BURDICK (nonalcoholic steatohepatitis)     had liver biopsy     Past Surgical History:   Procedure Laterality Date     COLONOSCOPY WITH CO2 INSUFFLATION N/A 11/15/2018    Procedure: COLONOSCOPY WITH CO2 INSUFFLATION;  Surgeon: Timi Ballard MD;  Location: [facility] OR     TONSILLECTOMY       Family History   Problem Relation Age of Onset     Heart Disease Father      Diabetes Maternal Grandfather " "     Thyroid Disease Daughter          Objective  BP (!) 147/82   Ht 1.727 m (5' 8\")   Wt 73.9 kg (163 lb)   BMI 24.78 kg/m        General: healthy, alert and in no distress      HEENT: no scleral icterus or conjunctival erythema     Skin: no suspicious lesions or rash. No jaundice.     CV: regular rhythm by palpation, 2+ distal pulses, no pedal edema      Resp: normal respiratory effort without conversational dyspnea     Psych: normal mood and affect      Gait: nonantalgic, appropriate coordination and balance     Neuro: normal light touch sensory exam of the extremities. Motor strength as noted below       R Elbow exam  Inspection: no swelling  Tender: lateral epicondyle and common extensor tendon  Non-tender: medial epicondyle, common flexor tendon, flexor muscle of forearm, supracondylar notch, olecranon bursa, distal bicep tendon and radial head/neck  Range of Motion: all normal  Strength: elbow strength full and pain with resisted wrist extension and supination  Special tests: normal stability, normal valgus stress, normal varus stress, ulnar Tinel's negative, no pain with resisted middle finger extension, no pain with resisted wrist flexion:       Radiology:  No imaging needed today    Assessment:  1. Right elbow pain    2. Lateral epicondylitis of right elbow        Plan:  Discussed the assessment with the patient.  prn  Supportive and conservative care strategies reviewed  Ergonomics considerations reviewed, consider requesting ergonomics eval  Wrist brace use strategies reviewed, as well as counterforce strap  Use of short 1-2 wk course of NSAIDs reviewed, dosing and precautions reviewed if utilized  Consider voltaren gel for decreasing local inflammation  Consideration of CSI in future but hope to avoid  Tenex and PRP also an option based on shorter term improvement  We discussed modified progressive pain-free activity as tolerated  Home handouts provided and supportive care reviewed  All questions " were answered today  Contact us with additional questions or concerns  Signs and sx of concern reviewed      Dav Vee DO, CAQ  Primary Care Sports Medicine  [facility]     Time spent in one-on-one evaluation and discussion with patient regarding nature of problem, course, prior treatments, and therapeutic options, at least 50% of which was spent in counseling and coordination of care: 25 minutes      Disclaimer: This note consists of symbols derived from keyboarding, dictation and/or voice recognition software. As a result, there may be errors in the script that have gone undetected. Please consider this when interpreting information found in this chart.    Again, thank you for allowing me to participate in the care of your patient.        Sincerely,        Dav Vee DO

## 2020-09-22 ENCOUNTER — TRANSFERRED RECORDS (OUTPATIENT)
Dept: HEALTH INFORMATION MANAGEMENT | Facility: CLINIC | Age: 65
End: 2020-09-22
Payer: COMMERCIAL

## 2020-12-14 ENCOUNTER — HEALTH MAINTENANCE LETTER (OUTPATIENT)
Age: 65
End: 2020-12-14

## 2021-02-03 DIAGNOSIS — E78.5 HYPERLIPIDEMIA LDL GOAL <130: ICD-10-CM

## 2021-02-03 RX ORDER — PRAVASTATIN SODIUM 20 MG
TABLET ORAL
Qty: 30 TABLET | Refills: 0 | Status: SHIPPED | OUTPATIENT
Start: 2021-02-03 | End: 2021-03-09

## 2021-02-04 DIAGNOSIS — E03.9 ACQUIRED HYPOTHYROIDISM: ICD-10-CM

## 2021-02-04 RX ORDER — LEVOTHYROXINE SODIUM 75 UG/1
TABLET ORAL
Qty: 30 TABLET | Refills: 0 | Status: SHIPPED | OUTPATIENT
Start: 2021-02-04 | End: 2021-03-09

## 2021-02-27 ENCOUNTER — HEALTH MAINTENANCE LETTER (OUTPATIENT)
Age: 66
End: 2021-02-27

## 2021-03-04 DIAGNOSIS — E78.5 HYPERLIPIDEMIA LDL GOAL <130: ICD-10-CM

## 2021-03-04 DIAGNOSIS — E03.9 ACQUIRED HYPOTHYROIDISM: ICD-10-CM

## 2021-03-09 RX ORDER — LEVOTHYROXINE SODIUM 75 UG/1
TABLET ORAL
Qty: 30 TABLET | Refills: 0 | Status: SHIPPED | OUTPATIENT
Start: 2021-03-09 | End: 2021-03-25

## 2021-03-09 RX ORDER — PRAVASTATIN SODIUM 20 MG
TABLET ORAL
Qty: 30 TABLET | Refills: 0 | Status: SHIPPED | OUTPATIENT
Start: 2021-03-09 | End: 2021-03-25

## 2021-03-25 ENCOUNTER — OFFICE VISIT (OUTPATIENT)
Dept: INTERNAL MEDICINE | Facility: CLINIC | Age: 66
End: 2021-03-25
Payer: COMMERCIAL

## 2021-03-25 VITALS
OXYGEN SATURATION: 100 % | HEIGHT: 68 IN | HEART RATE: 64 BPM | RESPIRATION RATE: 16 BRPM | DIASTOLIC BLOOD PRESSURE: 88 MMHG | SYSTOLIC BLOOD PRESSURE: 138 MMHG | WEIGHT: 182.8 LBS | TEMPERATURE: 98.6 F | BODY MASS INDEX: 27.71 KG/M2

## 2021-03-25 DIAGNOSIS — E03.9 ACQUIRED HYPOTHYROIDISM: ICD-10-CM

## 2021-03-25 DIAGNOSIS — K75.81 NASH (NONALCOHOLIC STEATOHEPATITIS): ICD-10-CM

## 2021-03-25 DIAGNOSIS — R73.09 ELEVATED GLUCOSE: ICD-10-CM

## 2021-03-25 DIAGNOSIS — Z28.83: ICD-10-CM

## 2021-03-25 DIAGNOSIS — I10 HYPERTENSION GOAL BP (BLOOD PRESSURE) < 140/90: Primary | ICD-10-CM

## 2021-03-25 DIAGNOSIS — Z98.890 HX OF LASIK: ICD-10-CM

## 2021-03-25 DIAGNOSIS — Z23 NEED FOR VACCINATION FOR DTAP: ICD-10-CM

## 2021-03-25 DIAGNOSIS — E78.5 HYPERLIPIDEMIA LDL GOAL <130: ICD-10-CM

## 2021-03-25 DIAGNOSIS — H57.9 ITCHY EYES: ICD-10-CM

## 2021-03-25 LAB
HBA1C MFR BLD: 5 % (ref 0–5.6)
HGB BLD-MCNC: 14.8 G/DL (ref 13.3–17.7)

## 2021-03-25 PROCEDURE — 83036 HEMOGLOBIN GLYCOSYLATED A1C: CPT | Performed by: INTERNAL MEDICINE

## 2021-03-25 PROCEDURE — 84439 ASSAY OF FREE THYROXINE: CPT | Performed by: INTERNAL MEDICINE

## 2021-03-25 PROCEDURE — 36415 COLL VENOUS BLD VENIPUNCTURE: CPT | Performed by: INTERNAL MEDICINE

## 2021-03-25 PROCEDURE — 85018 HEMOGLOBIN: CPT | Performed by: INTERNAL MEDICINE

## 2021-03-25 PROCEDURE — 80053 COMPREHEN METABOLIC PANEL: CPT | Performed by: INTERNAL MEDICINE

## 2021-03-25 PROCEDURE — 99214 OFFICE O/P EST MOD 30 MIN: CPT | Performed by: INTERNAL MEDICINE

## 2021-03-25 PROCEDURE — 84443 ASSAY THYROID STIM HORMONE: CPT | Performed by: INTERNAL MEDICINE

## 2021-03-25 PROCEDURE — 80061 LIPID PANEL: CPT | Performed by: INTERNAL MEDICINE

## 2021-03-25 RX ORDER — OLOPATADINE HYDROCHLORIDE 1 MG/ML
1 SOLUTION/ DROPS OPHTHALMIC 2 TIMES DAILY
Qty: 5 ML | Refills: 3 | Status: SHIPPED | OUTPATIENT
Start: 2021-03-25 | End: 2021-09-16

## 2021-03-25 RX ORDER — METOPROLOL TARTRATE 100 MG
50 TABLET ORAL 2 TIMES DAILY
Qty: 90 TABLET | Refills: 3 | Status: SHIPPED | OUTPATIENT
Start: 2021-03-25 | End: 2022-04-28

## 2021-03-25 RX ORDER — HYDROCHLOROTHIAZIDE 12.5 MG/1
CAPSULE ORAL
Qty: 90 CAPSULE | Refills: 3 | Status: SHIPPED | OUTPATIENT
Start: 2021-03-25 | End: 2022-04-28

## 2021-03-25 RX ORDER — LEVOTHYROXINE SODIUM 75 UG/1
TABLET ORAL
Qty: 90 TABLET | Refills: 3 | Status: SHIPPED | OUTPATIENT
Start: 2021-03-25 | End: 2022-04-11

## 2021-03-25 RX ORDER — PRAVASTATIN SODIUM 20 MG
TABLET ORAL
Qty: 90 TABLET | Refills: 3 | Status: SHIPPED | OUTPATIENT
Start: 2021-03-25 | End: 2022-04-11

## 2021-03-25 ASSESSMENT — MIFFLIN-ST. JEOR: SCORE: 1593.68

## 2021-03-25 NOTE — LETTER
March 29, 2021      Sonny Armstrong  [address] [facility] [address]        Dear ,    We are writing to inform you of your test results.    All of these tests are within acceptable limits , things look good !       Resulted Orders   Lipid panel reflex to direct LDL Fasting   Result Value Ref Range    Cholesterol 194 <200 mg/dL    Triglycerides 62 <150 mg/dL      Comment:      Fasting specimen    HDL Cholesterol 65 >39 mg/dL    LDL Cholesterol Calculated 117 (H) <100 mg/dL      Comment:      Above desirable:  100-129 mg/dl  Borderline High:  130-159 mg/dL  High:             160-189 mg/dL  Very high:       >189 mg/dl      Non HDL Cholesterol 129 <130 mg/dL   Comprehensive metabolic panel   Result Value Ref Range    Sodium 140 133 - 144 mmol/L    Potassium 4.0 3.4 - 5.3 mmol/L    Chloride 106 94 - 109 mmol/L    Carbon Dioxide 28 20 - 32 mmol/L    Anion Gap 6 3 - 14 mmol/L    Glucose 87 70 - 99 mg/dL      Comment:      Fasting specimen    Urea Nitrogen 21 7 - 30 mg/dL    Creatinine 0.85 0.66 - 1.25 mg/dL    GFR Estimate >90 >60 mL/min/[1.73_m2]      Comment:      Non  GFR Calc  Starting 12/18/2018, serum creatinine based estimated GFR (eGFR) will be   calculated using the Chronic Kidney Disease Epidemiology Collaboration   (CKD-EPI) equation.      GFR Estimate If Black >90 >60 mL/min/[1.73_m2]      Comment:       GFR Calc  Starting 12/18/2018, serum creatinine based estimated GFR (eGFR) will be   calculated using the Chronic Kidney Disease Epidemiology Collaboration   (CKD-EPI) equation.      Calcium 9.6 8.5 - 10.1 mg/dL    Bilirubin Total 0.6 0.2 - 1.3 mg/dL    Albumin 4.6 3.4 - 5.0 g/dL    Protein Total 7.6 6.8 - 8.8 g/dL    Alkaline Phosphatase 56 40 - 150 U/L    ALT 39 0 - 70 U/L    AST 23 0 - 45 U/L   TSH with free T4 reflex   Result Value Ref Range    TSH 6.44 (H) 0.40 - 4.00 mU/L   Hemoglobin   Result Value Ref Range    Hemoglobin 14.8 13.3 - 17.7  g/dL   Hemoglobin A1c   Result Value Ref Range    Hemoglobin A1C 5.0 0 - 5.6 %      Comment:      Normal <5.7% Prediabetes 5.7-6.4%  Diabetes 6.5% or higher - adopted from ADA   consensus guidelines.     T4 free   Result Value Ref Range    T4 Free 0.96 0.76 - 1.46 ng/dL       If you have any questions or concerns, please call the clinic at the number listed above.       Sincerely,      Moreno Padilla MD/clements

## 2021-03-25 NOTE — PROGRESS NOTES
Assessment & Plan     Hypertension goal BP (blood pressure) < 140/90  This is a patient who is generally doing well. I am seeing him annually for an overview of all the health issues, only just a few. His blood pressure is controlled within acceptable limits. He's due for laboratory studies today . No new issues to discuss, no major concerns   - hydrochlorothiazide (MICROZIDE) 12.5 MG capsule; TAKE 1 CAPSULE(12.5 MG) BY MOUTH DAILY  - metoprolol tartrate (LOPRESSOR) 100 MG tablet; Take 0.5 tablets (50 mg) by mouth 2 times daily TAKE 1/2 TABLET BY MOUTH TWICE DAILY  - Hemoglobin    Acquired hypothyroidism  Continue current plan of care    - levothyroxine (SYNTHROID/LEVOTHROID) 75 MCG tablet; TAKE 1 TABLET BY MOUTH DAILY  - Comprehensive metabolic panel  - TSH with free T4 reflex  - Hemoglobin    Hyperlipidemia LDL goal <130  Continue current plan of care    - pravastatin (PRAVACHOL) 20 MG tablet; TAKE 1 TABLET BY MOUTH EVERY DAY  - Lipid panel reflex to direct LDL Fasting  - Hemoglobin    Itchy eyes  Refills provided   - olopatadine (PATANOL) 0.1 % ophthalmic solution; Place 1 drop into both eyes 2 times daily  - Hemoglobin    Hx of LASIK, ou  Noted as a point of historical importance   - Hemoglobin    Elevated glucose  Doubt clinical significance but warrants hemoglobin a1c  [ diabetes test ]   - Hemoglobin  - Hemoglobin A1c    BURDICK (nonalcoholic steatohepatitis)  Due for recheck of liver function tests   - Comprehensive metabolic panel  - Hemoglobin    2019-nCoV vaccine not available  He plans to get the vaccination when it is available   - Hemoglobin    Need for vaccination for DTaP  Postponed   - Hemoglobin    Review of the result(s) of each unique test - prior labs from one yr ago  22 minutes spent on the date of the encounter doing chart review, history and exam, documentation and further activities as noted above        Return in about 1 year (around 3/25/2022).    Moreno Padilla MD  [facility]  "[facility]    Subjective   Sonny is a 65 year old who presents for the following health issues   Encounter Diagnoses   Name Primary?     Hypertension goal BP (blood pressure) < 140/90 Yes     Acquired hypothyroidism      Hyperlipidemia LDL goal <130      Itchy eyes      Hx of LASIK, ou      Elevated glucose      BURDICK (nonalcoholic steatohepatitis)      2019-nCoV vaccine not available      Need for vaccination for DTaP       accompanied by his spouse:    HPI   Chief Complaint   Patient presents with     Recheck Medication     Generally doing well, no major concerns   Prescriptions reviewed   Last appointment with me was January 2020    Wt Readings from Last 5 Encounters:   03/25/21 82.9 kg (182 lb 12.8 oz)   08/29/20 73.9 kg (163 lb)   01/27/20 84.8 kg (187 lb)   10/04/19 97.5 kg (215 lb)   01/30/19 103.4 kg (228 lb)     Body mass index is 27.79 kg/m .    Review of Systems   Constitutional, HEENT, cardiovascular, pulmonary, gi and gu systems are negative, except as otherwise noted.      Objective    /88   Pulse 64   Temp 98.6  F (37  C) (Oral)   Resp 16   Ht 1.727 m (5' 8\")   Wt 82.9 kg (182 lb 12.8 oz)   SpO2 100%   BMI 27.79 kg/m    There is no height or weight on file to calculate BMI.  Physical Exam   GENERAL: healthy, alert and no distress  NECK: no adenopathy, no asymmetry, masses, or scars and thyroid normal to palpation  RESP: lungs clear to auscultation - no rales, rhonchi or wheezes  CV: regular rate and rhythm, normal S1 S2, no S3 or S4, no murmur, click or rub, no peripheral edema and peripheral pulses strong  MS: no gross musculoskeletal defects noted, no edema  NEURO: Normal strength and tone, mentation intact and speech normal  PSYCH: mentation appears normal, affect normal/bright    Orders Placed This Encounter   Procedures     Lipid panel reflex to direct LDL Fasting     Comprehensive metabolic panel     TSH with free T4 reflex     Hemoglobin     Hemoglobin A1c             "

## 2021-03-26 LAB
ALBUMIN SERPL-MCNC: 4.6 G/DL (ref 3.4–5)
ALP SERPL-CCNC: 56 U/L (ref 40–150)
ALT SERPL W P-5'-P-CCNC: 39 U/L (ref 0–70)
ANION GAP SERPL CALCULATED.3IONS-SCNC: 6 MMOL/L (ref 3–14)
AST SERPL W P-5'-P-CCNC: 23 U/L (ref 0–45)
BILIRUB SERPL-MCNC: 0.6 MG/DL (ref 0.2–1.3)
BUN SERPL-MCNC: 21 MG/DL (ref 7–30)
CALCIUM SERPL-MCNC: 9.6 MG/DL (ref 8.5–10.1)
CHLORIDE SERPL-SCNC: 106 MMOL/L (ref 94–109)
CHOLEST SERPL-MCNC: 194 MG/DL
CO2 SERPL-SCNC: 28 MMOL/L (ref 20–32)
CREAT SERPL-MCNC: 0.85 MG/DL (ref 0.66–1.25)
GFR SERPL CREATININE-BSD FRML MDRD: >90 ML/MIN/{1.73_M2}
GLUCOSE SERPL-MCNC: 87 MG/DL (ref 70–99)
HDLC SERPL-MCNC: 65 MG/DL
LDLC SERPL CALC-MCNC: 117 MG/DL
NONHDLC SERPL-MCNC: 129 MG/DL
POTASSIUM SERPL-SCNC: 4 MMOL/L (ref 3.4–5.3)
PROT SERPL-MCNC: 7.6 G/DL (ref 6.8–8.8)
SODIUM SERPL-SCNC: 140 MMOL/L (ref 133–144)
T4 FREE SERPL-MCNC: 0.96 NG/DL (ref 0.76–1.46)
TRIGL SERPL-MCNC: 62 MG/DL
TSH SERPL DL<=0.005 MIU/L-ACNC: 6.44 MU/L (ref 0.4–4)

## 2021-09-15 DIAGNOSIS — H57.9 ITCHY EYES: ICD-10-CM

## 2021-09-16 RX ORDER — OLOPATADINE HYDROCHLORIDE 1 MG/ML
SOLUTION/ DROPS OPHTHALMIC
Qty: 5 ML | Refills: 3 | Status: SHIPPED | OUTPATIENT
Start: 2021-09-16

## 2021-10-02 ENCOUNTER — HEALTH MAINTENANCE LETTER (OUTPATIENT)
Age: 66
End: 2021-10-02

## 2022-01-22 ENCOUNTER — HEALTH MAINTENANCE LETTER (OUTPATIENT)
Age: 67
End: 2022-01-22

## 2022-02-23 NOTE — TELEPHONE ENCOUNTER
Prescription approved per [facility] Refill Protocol.    Jewell Waldrop RN     Problem: DISCHARGE PLANNING  Goal: Discharge to home or other facility with appropriate resources  Description: INTERVENTIONS:  - Identify barriers to discharge w/patient and caregiver  - Arrange for needed discharge resources and transportation as appropriate  - Identify discharge learning needs (meds, wound care, etc )  - Arrange for interpretive services to assist at discharge as needed  - Refer to Case Management Department for coordinating discharge planning if the patient needs post-hospital services based on physician/advanced practitioner order or complex needs related to functional status, cognitive ability, or social support system  Outcome: Progressing

## 2022-04-09 DIAGNOSIS — E78.5 HYPERLIPIDEMIA LDL GOAL <130: ICD-10-CM

## 2022-04-09 DIAGNOSIS — E03.9 ACQUIRED HYPOTHYROIDISM: ICD-10-CM

## 2022-04-11 DIAGNOSIS — E03.9 ACQUIRED HYPOTHYROIDISM: ICD-10-CM

## 2022-04-11 DIAGNOSIS — E78.5 HYPERLIPIDEMIA LDL GOAL <130: ICD-10-CM

## 2022-04-11 RX ORDER — LEVOTHYROXINE SODIUM 75 UG/1
TABLET ORAL
Qty: 60 TABLET | Refills: 0 | Status: SHIPPED | OUTPATIENT
Start: 2022-04-11 | End: 2022-04-13

## 2022-04-11 RX ORDER — PRAVASTATIN SODIUM 20 MG
TABLET ORAL
Qty: 60 TABLET | Refills: 0 | Status: SHIPPED | OUTPATIENT
Start: 2022-04-11 | End: 2022-04-13

## 2022-04-11 NOTE — TELEPHONE ENCOUNTER
Routing refill request to provider for review/approval because:  Labs out of range:    TSH   Date Value Ref Range Status   03/25/2021 6.44 (H) 0.40 - 4.00 mU/L Final             
Detail Level: Detailed
Detail Level: Zone

## 2022-04-13 RX ORDER — LEVOTHYROXINE SODIUM 75 UG/1
TABLET ORAL
Qty: 90 TABLET | Refills: 0 | Status: SHIPPED | OUTPATIENT
Start: 2022-04-13 | End: 2022-04-27

## 2022-04-13 RX ORDER — PRAVASTATIN SODIUM 20 MG
TABLET ORAL
Qty: 90 TABLET | Refills: 0 | Status: SHIPPED | OUTPATIENT
Start: 2022-04-13 | End: 2022-04-27

## 2022-04-13 NOTE — TELEPHONE ENCOUNTER
"Routing refill request to provider for review/approval because:  Lyn refill sent (60 day supply) on 4/11/22, pharmacy requesting 90 day supply.   Patient is now scheduled with PCP on 4/28/22, keely for 90 day supply?      Requested Prescriptions   Pending Prescriptions Disp Refills     levothyroxine (SYNTHROID/LEVOTHROID) 75 MCG tablet [Pharmacy Med Name: LEVOTHYROXINE 0.075MG (75MCG) TABS] 90 tablet      Sig: TAKE 1 TABLET BY MOUTH DAILY       Thyroid Protocol Failed - 4/13/2022  8:35 AM        Failed - Normal TSH on file in past 12 months     Recent Labs   Lab Test 03/25/21  1633   TSH 6.44*              Passed - Patient is 12 years or older        Passed - Recent (12 mo) or future (30 days) visit within the authorizing provider's specialty     Patient has had an office visit with the authorizing provider or a provider within the authorizing providers department within the previous 12 mos or has a future within next 30 days. See \"Patient Info\" tab in inLoylapet, or \"Choose Columns\" in Meds & Orders section of the refill encounter.              Passed - Medication is active on med list           pravastatin (PRAVACHOL) 20 MG tablet [Pharmacy Med Name: PRAVASTATIN 20MG TABLETS] 90 tablet      Sig: TAKE 1 TABLET BY MOUTH EVERY DAY       Statins Protocol Failed - 4/13/2022  8:35 AM        Failed - LDL on file in past 12 months     Recent Labs   Lab Test 03/25/21  1633   *             Passed - No abnormal creatine kinase in past 12 months     No lab results found.             Passed - Recent (12 mo) or future (30 days) visit within the authorizing provider's specialty     Patient has had an office visit with the authorizing provider or a provider within the authorizing providers department within the previous 12 mos or has a future within next 30 days. See \"Patient Info\" tab in in[employer], or \"Choose Columns\" in Meds & Orders section of the refill encounter.              Passed - Medication is active on med list    "     Passed - Patient is age 18 or older           Jewell Anthony RN

## 2022-04-13 NOTE — TELEPHONE ENCOUNTER
Lyn refill was sent by provider, pharmacy now requesting 90 day supply.  Patient has not scheduled and it has been over a year since last visit, please have patient schedule visit.     Jewell Anthony RN

## 2022-04-28 ENCOUNTER — OFFICE VISIT (OUTPATIENT)
Dept: INTERNAL MEDICINE | Facility: CLINIC | Age: 67
End: 2022-04-28
Payer: COMMERCIAL

## 2022-04-28 VITALS
TEMPERATURE: 98.3 F | DIASTOLIC BLOOD PRESSURE: 74 MMHG | HEART RATE: 53 BPM | RESPIRATION RATE: 12 BRPM | WEIGHT: 178 LBS | BODY MASS INDEX: 27.06 KG/M2 | OXYGEN SATURATION: 100 % | SYSTOLIC BLOOD PRESSURE: 162 MMHG

## 2022-04-28 DIAGNOSIS — E78.5 HYPERLIPIDEMIA LDL GOAL <130: Primary | ICD-10-CM

## 2022-04-28 DIAGNOSIS — R53.82 CHRONIC FATIGUE: ICD-10-CM

## 2022-04-28 DIAGNOSIS — Z23 NEED FOR DIPHTHERIA-TETANUS-PERTUSSIS (TDAP) VACCINE: ICD-10-CM

## 2022-04-28 DIAGNOSIS — R73.09 ELEVATED GLUCOSE: ICD-10-CM

## 2022-04-28 DIAGNOSIS — I10 HYPERTENSION GOAL BP (BLOOD PRESSURE) < 140/90: ICD-10-CM

## 2022-04-28 DIAGNOSIS — E03.9 ACQUIRED HYPOTHYROIDISM: ICD-10-CM

## 2022-04-28 LAB
ANION GAP SERPL CALCULATED.3IONS-SCNC: 5 MMOL/L (ref 3–14)
BUN SERPL-MCNC: 7 MG/DL (ref 7–30)
CALCIUM SERPL-MCNC: 9.8 MG/DL (ref 8.5–10.1)
CHLORIDE BLD-SCNC: 101 MMOL/L (ref 94–109)
CHOLEST SERPL-MCNC: 193 MG/DL
CO2 SERPL-SCNC: 30 MMOL/L (ref 20–32)
CREAT SERPL-MCNC: 0.81 MG/DL (ref 0.66–1.25)
FASTING STATUS PATIENT QL REPORTED: ABNORMAL
GFR SERPL CREATININE-BSD FRML MDRD: >90 ML/MIN/1.73M2
GLUCOSE BLD-MCNC: 92 MG/DL (ref 70–99)
HDLC SERPL-MCNC: 66 MG/DL
HGB BLD-MCNC: 13.7 G/DL (ref 13.3–17.7)
LDLC SERPL CALC-MCNC: 118 MG/DL
NONHDLC SERPL-MCNC: 127 MG/DL
POTASSIUM BLD-SCNC: 4.2 MMOL/L (ref 3.4–5.3)
SODIUM SERPL-SCNC: 136 MMOL/L (ref 133–144)
TRIGL SERPL-MCNC: 44 MG/DL
TSH SERPL DL<=0.005 MIU/L-ACNC: 3.35 MU/L (ref 0.4–4)

## 2022-04-28 PROCEDURE — 36415 COLL VENOUS BLD VENIPUNCTURE: CPT | Performed by: INTERNAL MEDICINE

## 2022-04-28 PROCEDURE — 80048 BASIC METABOLIC PNL TOTAL CA: CPT | Performed by: INTERNAL MEDICINE

## 2022-04-28 PROCEDURE — 84443 ASSAY THYROID STIM HORMONE: CPT | Performed by: INTERNAL MEDICINE

## 2022-04-28 PROCEDURE — 90471 IMMUNIZATION ADMIN: CPT | Performed by: INTERNAL MEDICINE

## 2022-04-28 PROCEDURE — 99214 OFFICE O/P EST MOD 30 MIN: CPT | Mod: 25 | Performed by: INTERNAL MEDICINE

## 2022-04-28 PROCEDURE — 80061 LIPID PANEL: CPT | Performed by: INTERNAL MEDICINE

## 2022-04-28 PROCEDURE — 85018 HEMOGLOBIN: CPT | Performed by: INTERNAL MEDICINE

## 2022-04-28 PROCEDURE — 90715 TDAP VACCINE 7 YRS/> IM: CPT | Performed by: INTERNAL MEDICINE

## 2022-04-28 RX ORDER — LEVOTHYROXINE SODIUM 75 UG/1
TABLET ORAL
Qty: 90 TABLET | Refills: 3 | Status: SHIPPED | OUTPATIENT
Start: 2022-04-28 | End: 2023-03-27

## 2022-04-28 RX ORDER — METOPROLOL TARTRATE 100 MG
50 TABLET ORAL 2 TIMES DAILY
Qty: 90 TABLET | Refills: 3 | Status: SHIPPED | OUTPATIENT
Start: 2022-04-28 | End: 2023-03-27

## 2022-04-28 RX ORDER — HYDROCHLOROTHIAZIDE 12.5 MG/1
CAPSULE ORAL
Qty: 90 CAPSULE | Refills: 3 | Status: SHIPPED | OUTPATIENT
Start: 2022-04-28 | End: 2023-03-27

## 2022-04-28 RX ORDER — PRAVASTATIN SODIUM 20 MG
TABLET ORAL
Qty: 90 TABLET | Refills: 3 | Status: SHIPPED | OUTPATIENT
Start: 2022-04-28 | End: 2023-03-27

## 2022-04-28 NOTE — PROGRESS NOTES
Assessment & Plan     Hyperlipidemia LDL goal <130  Seeing this patient for an annual medication check appointment . He did not schedule this as a complete physical exam . Everything is stable phase of chronic illness . Due for laboratory studies today   - Lipid panel reflex to direct LDL Non-fasting  - pravastatin (PRAVACHOL) 20 MG tablet; TAKE 1 TABLET BY MOUTH EVERY DAY  - REVIEW OF HEALTH MAINTENANCE PROTOCOL ORDERS    Hypertension goal BP (blood pressure) < 140/90  Not Controlled within acceptable limits, however patient states he forgot his metoprolol today . Medical assistant blood pressure recheck  Is recommended with further follow up depending on the test results   - Basic metabolic panel  - Hemoglobin  - hydrochlorothiazide (MICROZIDE) 12.5 MG capsule; TAKE 1 CAPSULE(12.5 MG) BY MOUTH DAILY  - metoprolol tartrate (LOPRESSOR) 100 MG tablet; Take 0.5 tablets (50 mg) by mouth 2 times daily TAKE 1/2 TABLET BY MOUTH TWICE DAILY  - REVIEW OF HEALTH MAINTENANCE PROTOCOL ORDERS    Elevated glucose  Doubt clinical significance but warrants hemoglobin a1c  [ diabetes test ]   - Basic metabolic panel  - Hemoglobin  - REVIEW OF HEALTH MAINTENANCE PROTOCOL ORDERS    Chronic fatigue  Multifactorial , problem is stable and ongoing monitoring    - REVIEW OF HEALTH MAINTENANCE PROTOCOL ORDERS    Acquired hypothyroidism  Due for laboratory studies. Further follow up depending on the test results   - TSH with free T4 reflex  - levothyroxine (SYNTHROID/LEVOTHROID) 75 MCG tablet; TAKE 1 TABLET BY MOUTH DAILY  - REVIEW OF HEALTH MAINTENANCE PROTOCOL ORDERS    Need for diphtheria-tetanus-pertussis (Tdap) vaccine  Administered   - REVIEW OF HEALTH MAINTENANCE PROTOCOL ORDERS  - TDAP VACCINE (Adacel, Boostrix)    Review of the result(s) of each unique test - see todays labs  Prescription drug management  22 minutes spent on the date of the encounter doing chart review, history and exam, documentation and further activities per  the note      Return for MA blood pressure recheck in 2-3 weeks.    Moreno Padilla MD  [facility] EV[facility] Dennis is a 66 year old who presents for the following health issues   Encounter Diagnoses   Name Primary?     Hyperlipidemia LDL goal <130 Yes     Hypertension goal BP (blood pressure) < 140/90      Elevated glucose      Chronic fatigue      Acquired hypothyroidism      Need for diphtheria-tetanus-pertussis (Tdap) vaccine        History of Present Illness       Reason for visit:  Yearly visit    He eats 2-3 servings of fruits and vegetables daily.He consumes 0 sweetened beverage(s) daily.He exercises with enough effort to increase his heart rate 60 or more minutes per day.  He exercises with enough effort to increase his heart rate 4 days per week.   He is taking medications regularly.     Last appointment with me was 3-    Health Maintenance Due   Topic Date Due     ANNUAL REVIEW OF  ORDERS  Never done     COVID-19 Vaccine (1) Never done     EYE EXAM  11/03/2015     DTAP/TDAP/TD IMMUNIZATION (2 - Td or Tdap) 11/16/2020     INFLUENZA VACCINE (1) 09/01/2021     MEDICARE ANNUAL WELLNESS VISIT  11/04/2021     FALL RISK ASSESSMENT  Never done     AORTIC ANEURYSM SCREENING (SYSTEM ASSIGNED)  Never done     PHQ-2 (once per calendar year)  01/01/2022     TSH W/FREE T4 REFLEX  03/25/2022     Pneumococcal Vaccine: 65+ Years (1 of 1 - PPSV23) 11/04/2021      patient has no interest, tends not to be interested in Healthcare maintenance section / Healthcare Gaps   [facility] screening tests patient had he says this demonstrated normal aorta and other tests and feels this is an adequate screening for abdominal aortic aneurysm . He will send us a copy of this for his chart        Review of Systems   Constitutional, HEENT, cardiovascular, pulmonary, gi and gu systems are negative, except as otherwise noted.      Objective    BP (!) 162/74   Pulse 53   Temp 98.3  F (36.8  C) (Oral)    Resp 12   Wt 80.7 kg (178 lb)   SpO2 100%   BMI 27.06 kg/m    There is no height or weight on file to calculate BMI.  Physical Exam   BP Readings from Last 6 Encounters:   04/28/22 (!) 162/74   03/25/21 138/88   08/29/20 (!) 147/82   01/27/20 116/64   10/04/19 (!) 148/80   01/30/19 (!) 152/91       GENERAL: healthy, alert and no distress  NECK: no adenopathy, no asymmetry, masses, or scars and thyroid normal to palpation  RESP: lungs clear to auscultation - no rales, rhonchi or wheezes  CV: regular rate and rhythm, normal S1 S2, no S3 or S4, no murmur, click or rub, no peripheral edema and peripheral pulses strong  ABDOMEN: soft, nontender, no hepatosplenomegaly, no masses and bowel sounds normal  MS: no gross musculoskeletal defects noted, no edema    Orders Placed This Encounter   Procedures     REVIEW OF HEALTH MAINTENANCE PROTOCOL ORDERS     TDAP VACCINE (Adacel, Boostrix)     TSH with free T4 reflex     Lipid panel reflex to direct LDL Non-fasting     Basic metabolic panel     Hemoglobin

## 2022-04-28 NOTE — PROGRESS NOTES
Last appointment with me was 3-    Health Maintenance Due   Topic Date Due     ANNUAL REVIEW OF HM ORDERS  Never done     COVID-19 Vaccine (1) Never done     EYE EXAM  11/03/2015     DTAP/TDAP/TD IMMUNIZATION (2 - Td or Tdap) 11/16/2020     INFLUENZA VACCINE (1) 09/01/2021     MEDICARE ANNUAL WELLNESS VISIT  11/04/2021     FALL RISK ASSESSMENT  Never done     AORTIC ANEURYSM SCREENING (SYSTEM ASSIGNED)  Never done     PHQ-2 (once per calendar year)  01/01/2022     TSH W/FREE T4 REFLEX  03/25/2022     Pneumococcal Vaccine: 65+ Years (1 of 1 - PPSV23) 11/04/2021      See if patient has any interest, tends not to be interested in Healthcare maintenance section / Healthcare Gaps   Answers for HPI/ROS submitted by the patient on 4/28/2022  How many servings of fruits and vegetables do you eat daily?: 2-3  On average, how many sweetened beverages do you drink each day (Examples: soda, juice, sweet tea, etc.  Do NOT count diet or artificially sweetened beverages)?: 0  How many minutes a day do you exercise enough to make your heart beat faster?: 60 or more  How many days a week do you exercise enough to make your heart beat faster?: 4  How many days per week do you miss taking your medication?: 0  What is the reason for your visit today?: Yearly visit

## 2022-04-28 NOTE — PATIENT INSTRUCTIONS
Please help patient have scheduled date and time for the medical assistant blood pressure recheck  in 2-3 weeks

## 2022-04-28 NOTE — LETTER
April 29, 2022      Sonny Armstrong  [address] [address]        Dear ,    We are writing to inform you of your test results.    All of these tests are within acceptable limits , Things look good !          Resulted Orders   TSH with free T4 reflex   Result Value Ref Range    TSH 3.35 0.40 - 4.00 mU/L   Lipid panel reflex to direct LDL Non-fasting   Result Value Ref Range    Cholesterol 193 <200 mg/dL    Triglycerides 44 <150 mg/dL    Direct Measure HDL 66 >=40 mg/dL    LDL Cholesterol Calculated 118 (H) <=100 mg/dL    Non HDL Cholesterol 127 <130 mg/dL    Patient Fasting > 8hrs? Unknown     Narrative    Cholesterol  Desirable:  <200 mg/dL    Triglycerides  Normal:  Less than 150 mg/dL  Borderline High:  150-199 mg/dL  High:  200-499 mg/dL  Very High:  Greater than or equal to 500 mg/dL    Direct Measure HDL  Female:  Greater than or equal to 50 mg/dL   Male:  Greater than or equal to 40 mg/dL    LDL Cholesterol  Desirable:  <100mg/dL  Above Desirable:  100-129 mg/dL   Borderline High:  130-159 mg/dL   High:  160-189 mg/dL   Very High:  >= 190 mg/dL    Non HDL Cholesterol  Desirable:  130 mg/dL  Above Desirable:  130-159 mg/dL  Borderline High:  160-189 mg/dL  High:  190-219 mg/dL  Very High:  Greater than or equal to 220 mg/dL   Basic metabolic panel   Result Value Ref Range    Sodium 136 133 - 144 mmol/L    Potassium 4.2 3.4 - 5.3 mmol/L    Chloride 101 94 - 109 mmol/L    Carbon Dioxide (CO2) 30 20 - 32 mmol/L      Comment:      This result was previously suppressed from the chart.    Anion Gap 5 3 - 14 mmol/L      Comment:      This result was previously suppressed from the chart.    Urea Nitrogen 7 7 - 30 mg/dL      Comment:      This result was previously suppressed from the chart.    Creatinine 0.81 0.66 - 1.25 mg/dL      Comment:      This result was previously suppressed from the chart.    Calcium 9.8 8.5 - 10.1 mg/dL      Comment:      This result was previously  suppressed from the chart.    Glucose 92 70 - 99 mg/dL      Comment:      This result was previously suppressed from the chart.    GFR Estimate >90 >60 mL/min/1.73m2      Comment:      Effective December 21, 2021 eGFRcr in adults is calculated using the 2021 CKD-EPI creatinine equation which includes age and gender (Tim et al., NEJM, DOI: 10.1056/SNGMam3150224)   This result was previously suppressed from the chart.   Hemoglobin   Result Value Ref Range    Hemoglobin 13.7 13.3 - 17.7 g/dL       If you have any questions or concerns, please call the clinic at the number listed above.       Sincerely,      Moreno Padilla MD/starr

## 2022-05-19 ENCOUNTER — ALLIED HEALTH/NURSE VISIT (OUTPATIENT)
Dept: FAMILY MEDICINE | Facility: CLINIC | Age: 67
End: 2022-05-19
Payer: COMMERCIAL

## 2022-05-19 VITALS — DIASTOLIC BLOOD PRESSURE: 64 MMHG | SYSTOLIC BLOOD PRESSURE: 120 MMHG

## 2022-05-19 DIAGNOSIS — E78.5 HYPERLIPIDEMIA LDL GOAL <130: Primary | ICD-10-CM

## 2022-05-19 PROCEDURE — 99207 PR NO CHARGE NURSE ONLY: CPT

## 2022-05-19 NOTE — PROGRESS NOTES
Sonny Armstrong is a 66 year old patient who comes in today for a Blood Pressure check.  Initial BP: 120/64  Data Unavailable  Disposition: results routed to provider Dr. Valerie Davidson [facility]

## 2022-07-16 DIAGNOSIS — E03.9 ACQUIRED HYPOTHYROIDISM: ICD-10-CM

## 2022-07-16 DIAGNOSIS — E78.5 HYPERLIPIDEMIA LDL GOAL <130: ICD-10-CM

## 2022-07-18 RX ORDER — LEVOTHYROXINE SODIUM 75 UG/1
TABLET ORAL
Qty: 90 TABLET | Refills: 3 | OUTPATIENT
Start: 2022-07-18

## 2022-07-18 RX ORDER — PRAVASTATIN SODIUM 20 MG
TABLET ORAL
Qty: 90 TABLET | Refills: 3 | OUTPATIENT
Start: 2022-07-18

## 2023-01-14 ENCOUNTER — HEALTH MAINTENANCE LETTER (OUTPATIENT)
Age: 68
End: 2023-01-14

## 2023-03-27 ENCOUNTER — OFFICE VISIT (OUTPATIENT)
Dept: INTERNAL MEDICINE | Facility: CLINIC | Age: 68
End: 2023-03-27
Payer: COMMERCIAL

## 2023-03-27 VITALS
SYSTOLIC BLOOD PRESSURE: 120 MMHG | RESPIRATION RATE: 19 BRPM | DIASTOLIC BLOOD PRESSURE: 68 MMHG | HEIGHT: 68 IN | TEMPERATURE: 98.9 F | HEART RATE: 44 BPM | OXYGEN SATURATION: 100 % | BODY MASS INDEX: 27.58 KG/M2 | WEIGHT: 182 LBS

## 2023-03-27 DIAGNOSIS — E03.9 ACQUIRED HYPOTHYROIDISM: Primary | ICD-10-CM

## 2023-03-27 DIAGNOSIS — I10 HYPERTENSION GOAL BP (BLOOD PRESSURE) < 140/90: ICD-10-CM

## 2023-03-27 DIAGNOSIS — Z00.00 PREVENTATIVE HEALTH CARE: ICD-10-CM

## 2023-03-27 DIAGNOSIS — E78.5 HYPERLIPIDEMIA LDL GOAL <130: ICD-10-CM

## 2023-03-27 DIAGNOSIS — R00.1 BRADYCARDIA: ICD-10-CM

## 2023-03-27 DIAGNOSIS — Z12.11 SCREENING FOR COLORECTAL CANCER: ICD-10-CM

## 2023-03-27 DIAGNOSIS — Z13.6 SCREENING FOR AAA (ABDOMINAL AORTIC ANEURYSM): ICD-10-CM

## 2023-03-27 DIAGNOSIS — Z12.12 SCREENING FOR COLORECTAL CANCER: ICD-10-CM

## 2023-03-27 PROCEDURE — 80048 BASIC METABOLIC PNL TOTAL CA: CPT | Performed by: INTERNAL MEDICINE

## 2023-03-27 PROCEDURE — 99214 OFFICE O/P EST MOD 30 MIN: CPT | Performed by: INTERNAL MEDICINE

## 2023-03-27 PROCEDURE — 36415 COLL VENOUS BLD VENIPUNCTURE: CPT | Performed by: INTERNAL MEDICINE

## 2023-03-27 PROCEDURE — 80061 LIPID PANEL: CPT | Performed by: INTERNAL MEDICINE

## 2023-03-27 PROCEDURE — 84443 ASSAY THYROID STIM HORMONE: CPT | Performed by: INTERNAL MEDICINE

## 2023-03-27 RX ORDER — METOPROLOL TARTRATE 100 MG
50 TABLET ORAL 2 TIMES DAILY
Qty: 90 TABLET | Refills: 3 | Status: SHIPPED | OUTPATIENT
Start: 2023-03-27 | End: 2024-02-26

## 2023-03-27 RX ORDER — LEVOTHYROXINE SODIUM 75 UG/1
TABLET ORAL
Qty: 90 TABLET | Refills: 3 | Status: SHIPPED | OUTPATIENT
Start: 2023-03-27 | End: 2024-02-26

## 2023-03-27 RX ORDER — PRAVASTATIN SODIUM 20 MG
TABLET ORAL
Qty: 90 TABLET | Refills: 3 | Status: SHIPPED | OUTPATIENT
Start: 2023-03-27 | End: 2024-02-26

## 2023-03-27 NOTE — PROGRESS NOTES
Assessment & Plan     Acquired hypothyroidism  Today is a follow up office visit regarding need for medication check and refills and laboratory studies and annual checkup. Everything short of actually calling this a complete physical exam . He's due for the TSH ( thyroid test ) recheck with further follow up depending on the test results   - TSH with free T4 reflex; Future  - levothyroxine (SYNTHROID/LEVOTHROID) 75 MCG tablet; TAKE 1 TABLET BY MOUTH DAILY  - TSH with free T4 reflex    Hypertension goal BP (blood pressure) < 140/90  Controlled within acceptable limits, one concern however was his heart rate was truly as low as possible without needing a pacemaker .. he has  At his request of 45 and this was rechecked several times. A single time we recorded 42 but around 4-5 other heart rate checks he is consistently at 44. He is in reasonably good shape. His review of systems and symptoms are negative . Zero lightheadedness or shortness of breath or anything. Certainly it is possibly the beta blocker that's doing this but I see many many patients taking a beta blocker without this kind of profound bradycardia so I have ordered a zinc oxide 10 % OINT to confirm the presence of or absence of a concerning picture for complete heart block. Further follow up depending on the test results   - Basic metabolic panel  (Ca, Cl, CO2, Creat, Gluc, K, Na, BUN); Future  - metoprolol tartrate (LOPRESSOR) 100 MG tablet; Take 0.5 tablets (50 mg) by mouth 2 times daily TAKE 1/2 TABLET BY MOUTH TWICE DAILY  - Basic metabolic panel  (Ca, Cl, CO2, Creat, Gluc, K, Na, BUN)    Hyperlipidemia LDL goal <130  Routine screening , follow up as indicated on results   - Lipid panel reflex to direct LDL Fasting; Future  - pravastatin (PRAVACHOL) 20 MG tablet; TAKE 1 TABLET BY MOUTH EVERY DAY  - Lipid panel reflex to direct LDL Fasting    Screening for colorectal cancer  He's due for this test / procedure / healthcare maintenance , he agrees  -  Adult GI  Referral - Procedure Only; Future    Preventative health care  Recommended for eye exams   - Adult Eye  Referral; Future    Bradycardia  As detailed above   - Adult Leadless EKG Monitor 3 to 7 Days; Future    Screening for AAA (abdominal aortic aneurysm)  Routine screening   - US Aorta Medicare AAA Screening; Future    Review of the result(s) of each unique test - todays tests  Prescription drug management  24 minutes spent by me on the date of the encounter doing chart review, history and exam, documentation and further activities per the note    Orders Placed This Encounter   Procedures     US Aorta Medicare AAA Screening     Lipid panel reflex to direct LDL Fasting     Basic metabolic panel  (Ca, Cl, CO2, Creat, Gluc, K, Na, BUN)     TSH with free T4 reflex     Adult Eye  Referral     Adult GI  Referral - Procedure Only     Adult Leadless EKG Monitor 3 to 7 Days         Moreno Padilla MD  [facility] EVER Dennis is a 67 year old, presenting for the following health issues:  Hypertension and Lipids  No flowsheet data found.  History of Present Illness       Reason for visit:  Yearly med ckeck    He eats 0-1 servings of fruits and vegetables daily.He consumes 0 sweetened beverage(s) daily.He exercises with enough effort to increase his heart rate 60 or more minutes per day.  He exercises with enough effort to increase his heart rate 5 days per week.   He is taking medications regularly.       Hyperlipidemia Follow-Up      Are you regularly taking any medication or supplement to lower your cholesterol?   yes    Are you having muscle aches or other side effects that you think could be caused by your cholesterol lowering medication?  No    Hypertension Follow-up      Do you check your blood pressure regularly outside of the clinic? Yes     Are you following a low salt diet? Yes    Are your blood pressures ever more than 140 on the top number  "(systolic) OR more   than 90 on the bottom number (diastolic), for example 140/90? No    Hypothyroidism Follow-up      Since last visit, patient describes the following symptoms: Weight stable, no hair loss, no skin changes, no constipation, no loose stools      Review of Systems   Constitutional, HEENT, cardiovascular, pulmonary, gi and gu systems are negative, except as otherwise noted.      Objective    /68   Pulse (!) 42   Temp 98.9  F (37.2  C) (Temporal)   Resp 19   Ht 1.727 m (5' 8\")   Wt 82.6 kg (182 lb)   SpO2 100%   BMI 27.67 kg/m    Body mass index is 27.67 kg/m .  Physical Exam   GENERAL: healthy, alert and no distress  EYES: Eyes grossly normal to inspection, PERRL and conjunctivae and sclerae normal  RESP: lungs clear to auscultation - no rales, rhonchi or wheezes  CV: regular rate and rhythm, normal S1 S2, no S3 or S4, no murmur, click or rub, no peripheral edema and peripheral pulses strong  ABDOMEN: soft, nontender, no hepatosplenomegaly, no masses and bowel sounds normal  MS: no gross musculoskeletal defects noted, no edema      "

## 2023-03-28 ENCOUNTER — ALLIED HEALTH/NURSE VISIT (OUTPATIENT)
Dept: FAMILY MEDICINE | Facility: CLINIC | Age: 68
End: 2023-03-28
Payer: COMMERCIAL

## 2023-03-28 ENCOUNTER — ANCILLARY PROCEDURE (OUTPATIENT)
Dept: CARDIOLOGY | Facility: CLINIC | Age: 68
End: 2023-03-28
Attending: INTERNAL MEDICINE
Payer: COMMERCIAL

## 2023-03-28 DIAGNOSIS — R00.1 BRADYCARDIA: ICD-10-CM

## 2023-03-28 DIAGNOSIS — R00.1 BRADYCARDIA: Primary | ICD-10-CM

## 2023-03-28 LAB
ANION GAP SERPL CALCULATED.3IONS-SCNC: 4 MMOL/L (ref 3–14)
BUN SERPL-MCNC: 9 MG/DL (ref 7–30)
CALCIUM SERPL-MCNC: 9.9 MG/DL (ref 8.5–10.1)
CHLORIDE BLD-SCNC: 105 MMOL/L (ref 94–109)
CHOLEST SERPL-MCNC: 210 MG/DL
CO2 SERPL-SCNC: 29 MMOL/L (ref 20–32)
CREAT SERPL-MCNC: 0.89 MG/DL (ref 0.66–1.25)
FASTING STATUS PATIENT QL REPORTED: NO
GFR SERPL CREATININE-BSD FRML MDRD: >90 ML/MIN/1.73M2
GLUCOSE BLD-MCNC: 90 MG/DL (ref 70–99)
HDLC SERPL-MCNC: 78 MG/DL
LDLC SERPL CALC-MCNC: 122 MG/DL
NONHDLC SERPL-MCNC: 132 MG/DL
POTASSIUM BLD-SCNC: 4.7 MMOL/L (ref 3.4–5.3)
SODIUM SERPL-SCNC: 138 MMOL/L (ref 133–144)
TRIGL SERPL-MCNC: 52 MG/DL
TSH SERPL DL<=0.005 MIU/L-ACNC: 3.49 MU/L (ref 0.4–4)

## 2023-03-28 PROCEDURE — 93244 EXT ECG>48HR<7D REV&INTERPJ: CPT | Performed by: INTERNAL MEDICINE

## 2023-03-28 PROCEDURE — 99207 PR NO CHARGE NURSE ONLY: CPT

## 2023-03-28 PROCEDURE — 93242 EXT ECG>48HR<7D RECORDING: CPT | Performed by: INTERNAL MEDICINE

## 2023-03-28 NOTE — PROGRESS NOTES
I have place a 7 days Zio Patch on this patient. Patient was given Zio Patch instructions and [employer] contact information. Patient understands when they should remove and return their monitor to [employer].     Annie Hopson, CMA

## 2023-03-28 NOTE — PROGRESS NOTES
I have place a 7 days Zio Patch on this patient. Patient was given Zio Patch instructions and [employer] contact information. Patient understands when they should remove and return their monitor to [employer].     Annie Hposon, CMA

## 2023-03-30 ENCOUNTER — ANCILLARY PROCEDURE (OUTPATIENT)
Dept: ULTRASOUND IMAGING | Facility: CLINIC | Age: 68
End: 2023-03-30
Attending: INTERNAL MEDICINE
Payer: COMMERCIAL

## 2023-03-30 DIAGNOSIS — Z13.6 SCREENING FOR AAA (ABDOMINAL AORTIC ANEURYSM): ICD-10-CM

## 2023-03-30 PROCEDURE — 76706 US ABDL AORTA SCREEN AAA: CPT | Mod: TC | Performed by: RADIOLOGY

## 2023-04-05 ENCOUNTER — TELEPHONE (OUTPATIENT)
Dept: GASTROENTEROLOGY | Facility: CLINIC | Age: 68
End: 2023-04-05
Payer: COMMERCIAL

## 2023-04-05 NOTE — TELEPHONE ENCOUNTER
Screening Questions  BLUE  KIND OF PREP RED  LOCATION [review exclusion criteria] GREEN  SEDATION TYPE        Y Are you active on mychart?       ISAAC BILLY Ordering/Referring Provider?        [employer] What type of coverage do you have?      N Have you had a positive covid test in the last 14 days?     27.7 1. BMI  [BMI 40+ - review exclusion criteria]    Y  2. Are you able to give consent for your medical care? [IF NO,RN REVIEW]          N  3. Are you taking any prescription pain medications on a routine schedule   (ex narcotics: oxycodone, roxicodone, oxycontin,  and percocet)? [RN Review]          3a. EXTENDED PREP What kind of prescription?     N 4. Do you have any chemical dependencies such as alcohol, street drugs, or methadone?        **If yes 3- 5 , please schedule with MAC sedation.**          IF YES TO ANY 6 - 10 - HOSPITAL SETTING ONLY.     N 6.   Do you need assistance transferring?     N 7.   Have you had a heart or lung transplant?    N 8.   Are you currently on dialysis?   N 9.   Do you use daily home oxygen?   N 10. Do you take nitroglycerin?   10a.  If yes, how often?     11. [FEMALES]   Are you currently pregnant?    11a.  If yes, how many weeks? [ Greater than 12 weeks, OR NEEDED]    N 12. Do you have Pulmonary Hypertension? *NEED PAC APPT AT [facility] w/ [facility]*     N 13. [review exclusion criteria]  Do you have any implantable devices in your body (pacemaker, defib, LVAD)?    N 14. In the past 6 months, have you had any heart related issues including cardiomyopathy or heart attack?     14a.  If yes, did it require cardiac stenting if so when?     N 15. Have you had a stroke or Transient ischemic attack (TIA - aka  mini stroke ) within 6 months?      N 16. Do you have mod to severe Obstructive Sleep Apnea?  [Hospital only]    N 17. Do you have SEVERE AND UNCONTROLLED asthma? *NEED PAC APPT AT [facility] w/MAC*     18. Are you currently taking any blood thinners?     18a. No. Continue to 19.   18b. Yes/no  "Blood Thinner: No [CONTINUE TO #19]    N 19. Do you take the medication Phentermine?    19a. If yes, \"Hold for 7 days before procedure.  Please consult your prescribing provider if you have questions about holding this medication.\"     N  20. Do you have chronic kidney disease?      N  21. Do you have a diagnosis of diabetes?     N  22. On a regular basis do you go 3-5 days between bowel movements?      23. Preferred LOCAL Pharmacy for Pre Prescription    [ LIST ONLY ONE PHARMACY]     [id number] [address] AT [facility] [address]    - [facility] REMINDERS -    Informed patient they will need an adult    Cannot take any type of public or medical transportation alone    Conscious Sedation- Needs  for 6 hours after the procedure       MAC/General-Needs  for 24 hours after procedure    Pre-Procedure Covid test to be completed [[facility] PCR Testing Required]    Confirmed Nurse will call to complete assessment       - SCHEDULING DETAILS -  NO Hospital Setting Required? If yes, what is the exclusion?:    GARRICK  Surgeon    5/31/2023  Date of Procedure  Lower Endoscopy [Colonoscopy]  [facility]   [facility] GOLYTELY- If you answer yes to questions #8, #20, #21 [  pts ]Which Colonoscopy Prep was Sent?     MODERATE Sedation Type     NO PAC / Pre-op Required         Patient requested golytely prep.         "

## 2023-04-23 ENCOUNTER — HEALTH MAINTENANCE LETTER (OUTPATIENT)
Age: 68
End: 2023-04-23

## 2023-04-26 ENCOUNTER — TELEPHONE (OUTPATIENT)
Dept: INTERNAL MEDICINE | Facility: CLINIC | Age: 68
End: 2023-04-26
Payer: COMMERCIAL

## 2023-04-26 NOTE — TELEPHONE ENCOUNTER
Called both numbers listed in chart for patient/wife and left messages on both numbers requesting that they call us back at [facility] [phone]     When patient or wife calls back, please relay Moreno Hubbard's message below in BLUE     Mychart message also sent    Jeison Cueva RN  [facility] Patricia Padilla MD   4/20/2023  9:47 PM CDT       Tachy-cici syndrome also known as sick sinus syndrome is associated with a baseline heart rhythm of atrial fibrillation and then when a patient has this underlying cardiac arrhythmia, sometimes the rate is fast or slow. But patient does NOT have atrial fibrillation or sick sinus syndrome. The symptoms that led me to order the Zio Patch in the first place was asymptomatic sinus bradycardia during our complete physical exam appointment ,but at no single moment during the entire Zio Patch tracing was there any sinus bradycardia that was pathological. If I had noted such a thing , a referral to cardiology  for consideration of a permanent pacemaker in upper left chest wall would be an easy call. We don't see this  patient is certainly welcomed to review his symptoms and the Zio Patch results with a cardiologist if he wishes. I don't personally feel this is necessary but it is an option if patient or spouse wish for this.     Symptoms of generalized fatigue are often very difficult to pin down an exact cause. Rather then just ordering laboratory studies I would favor keep an eye on things and keep a symptoms diary and if he fails to feel any sense of improvement we should see him for a follow up appointment.- I am suggesting 4-8 weeks or so. In the meantime please promote     1. Proper sleeping, strive for EIGHT full hours a day  2. He needs a regular structured exercise program to achieve increased heart rate and increased stamina and a sense of Wellbeing. Good examples of cardiovascular fitness exercises are : Running  or jogging, swimming, bicycling, rowing, or other fitness machines, or resistance machines.  3. How is his mood ? What is he doing to keep his mind active, does he have hobbies ? What does he do for fun ? It is equally important to pursue something in life. Possibly he has depression      Moreno Peoples RN   4/20/2023  9:35 AM CDT       Dr. Padilla-- Pt's spouse wondering:  -Is wondering if sick sinus syndrome is any concern? Discussed with patient that the dominant HR was normal, and pt has declined any dizziness or had any fainting events.   States that he had an episode about a year ago with vasovagal event while having bowel movement. She was able during this event to get BP back up after laying patient down, but it took an hour to get HR up to normal.   -She is wondering if magnesium should be tested or any other lab tests for the generalized weakness? She states that patient has no new medications and she does not think that his current meds would be the cause of these symptoms.  She asked if labs should be done and if lab only appt okay or does pt need another appointment to address?  Please advise.     Called patient. He gave writer verbal consent to speak to his wife, Sravanthi. Relayed result message to her. She asked if patient had any bradycardia. Notified her that the reports shows the HR going down to 45 BPM for about a minute, but that the predominant rhythm was NSR. She states that while wearing the heart monitor, sometimes his pulse has gone down to 50's or 60's. Per spouse, pt has not had any syncopal episodes, dizziness or lightheadedness, and BP has been normal. Pt has complained of generalized weakness for the last few weeks. She asked him to explain this and he said that he feels like a chemo patient just sitting there, he feels like a drained battery and he has no energy. Denies any pain. This concerns her because he does not complain about much.     VITALY Munroe  [facility]    Moreno Padilla MD   4/19/2023  9:58 PM CDT       We need to please notify patient of this information. His Zio Patch shows no worrisome findings. He has 2 tiny bursts of supraventricular tachycardia that do not rise to the level of needing any else done.     Moreno Padilla MD

## 2023-05-03 NOTE — TELEPHONE ENCOUNTER
Patient's spouse has viewed the message on [employer].  Last read by Sravanthi Armstrong at  9:52 AM on 4/27/2023.      Jeison Cueva RN  [facility]

## 2023-05-15 ENCOUNTER — TELEPHONE (OUTPATIENT)
Dept: GASTROENTEROLOGY | Facility: CLINIC | Age: 68
End: 2023-05-15
Payer: COMMERCIAL

## 2023-05-15 DIAGNOSIS — Z12.11 ENCOUNTER FOR SCREENING COLONOSCOPY: Primary | ICD-10-CM

## 2023-05-15 RX ORDER — BISACODYL 5 MG/1
TABLET, DELAYED RELEASE ORAL
Qty: 4 TABLET | Refills: 0 | Status: SHIPPED | OUTPATIENT
Start: 2023-05-15 | End: 2023-05-31

## 2023-05-15 NOTE — TELEPHONE ENCOUNTER
Pre assessment questions completed for upcoming Colonoscopy  procedure scheduled on 5.31.2023    COVID policy reviewed.     Pre-op exam? N/A    Reviewed procedural arrival time 0945, procedure time 1030 and facility location [facility]; [address], [place] [address]    Designated  policy reviewed. Instructed to have someone stay 6 hours post procedure.     NSAIDs? No    Anticoagulation/blood thinners? No    Electronic implanted devices? No    Diabetic? No    Procedure indication: screening     Bowel prep recommendation: Standard Golytely  per patient's preference    Reviewed procedure prep instructions.     Prep instructions sent via Highlight.  Bowel prep script sent to [facility] #[id number] - [place], [place] - [address] AT [facility] [address] [facility].     Patient verbalized understanding and had no questions or concerns at this time.    Sarika Ruiz RN  Endoscopy Procedure Pre Assessment RN

## 2023-05-31 ENCOUNTER — HOSPITAL ENCOUNTER (OUTPATIENT)
Facility: AMBULATORY SURGERY CENTER | Age: 68
Discharge: HOME | End: 2023-05-31
Attending: STUDENT IN AN ORGANIZED HEALTH CARE EDUCATION/TRAINING PROGRAM | Admitting: STUDENT IN AN ORGANIZED HEALTH CARE EDUCATION/TRAINING PROGRAM
Payer: COMMERCIAL

## 2023-05-31 VITALS
RESPIRATION RATE: 16 BRPM | OXYGEN SATURATION: 98 % | TEMPERATURE: 97.8 F | DIASTOLIC BLOOD PRESSURE: 71 MMHG | HEART RATE: 72 BPM | SYSTOLIC BLOOD PRESSURE: 106 MMHG

## 2023-05-31 DIAGNOSIS — Z12.11 COLON CANCER SCREENING: Primary | ICD-10-CM

## 2023-05-31 LAB — COLONOSCOPY: NORMAL

## 2023-05-31 PROCEDURE — G0121 COLON CA SCRN NOT HI RSK IND: HCPCS

## 2023-05-31 PROCEDURE — G8907 PT DOC NO EVENTS ON DISCHARG: HCPCS

## 2023-05-31 PROCEDURE — G8918 PT W/O PREOP ORDER IV AB PRO: HCPCS

## 2023-05-31 RX ORDER — LIDOCAINE 40 MG/G
CREAM TOPICAL
Status: DISCONTINUED | OUTPATIENT
Start: 2023-05-31 | End: 2023-06-01 | Stop reason: HOSPADM

## 2023-05-31 RX ORDER — FENTANYL CITRATE 50 UG/ML
INJECTION, SOLUTION INTRAMUSCULAR; INTRAVENOUS PRN
Status: DISCONTINUED | OUTPATIENT
Start: 2023-05-31 | End: 2023-05-31 | Stop reason: HOSPADM

## 2023-05-31 RX ORDER — ONDANSETRON 2 MG/ML
4 INJECTION INTRAMUSCULAR; INTRAVENOUS
Status: DISCONTINUED | OUTPATIENT
Start: 2023-05-31 | End: 2023-06-01 | Stop reason: HOSPADM

## 2023-05-31 NOTE — H&P
Pre-Endoscopy History and Physical     Sonny Armstrong MRN# [id number]   YOB: 1955 Age: 67 year old     Date of Procedure: 5/31/2023  Primary care provider: Moreno Padilla  Type of Endoscopy: colonoscopy  Reason for Procedure: Surveillance, prev adenomas  Type of Anesthesia Anticipated: Moderate Sedation    HPI:    Sonny is a 67 year old male who will be undergoing the above procedure.      A history and physical has been performed. The patient's medications and allergies have been reviewed. The risks and benefits of the procedure and the sedation options and risks were discussed with the patient.  I specifically discussed about possible sedation medication reaction, bleeding, and one of the more rare complications of perforation.  All questions were answered and informed consent was obtained.      He denies a personal or family history of anesthesia complications or bleeding disorders.     No Known Allergies     Cannot display prior to admission medications because the patient has not been admitted in this contact.       Patient Active Problem List   Diagnosis     Hyperlipidemia LDL goal <130     Hypertension goal BP (blood pressure) < 140/90     BURDICK (nonalcoholic steatohepatitis)     Elevated glucose     Chronic fatigue     Advanced directives, counseling/discussion     Hx of LASIK, ou     Anterior corneal dystrophy, ou     Acquired hypothyroidism        Past Medical History:   Diagnosis Date     Hyperlipidemia LDL goal <130 11/16/2010     Hypertension goal BP (blood pressure) < 140/90 11/16/2010     Hypothyroidism 11/16/2010     BURDICK (nonalcoholic steatohepatitis) 2007    had liver biopsy        Past Surgical History:   Procedure Laterality Date     COLONOSCOPY WITH CO2 INSUFFLATION N/A 11/15/2018    Procedure: COLONOSCOPY WITH CO2 INSUFFLATION;  Surgeon: Timi Ballard MD;  Location: [facility] OR     TONSILLECTOMY  1971       Social History     Tobacco Use     Smoking status: Never      "Smokeless tobacco: Never   Vaping Use     Vaping status: Not on file   Substance Use Topics     Alcohol use: No       Family History   Problem Relation Age of Onset     Heart Disease Father      Diabetes Maternal Grandfather      Thyroid Disease Daughter        REVIEW OF SYSTEMS:     5 point ROS negative except as noted above in HPI, including Gen., Resp., CV, GI &  system review.      PHYSICAL EXAM:   /78   Temp 97.6  F (36.4  C) (Temporal)   Resp 16   SpO2 98%  Estimated body mass index is 27.67 kg/m  as calculated from the following:    Height as of 3/27/23: 1.727 m (5' 8\").    Weight as of 3/27/23: 82.6 kg (182 lb).   GENERAL APPEARANCE: healthy, alert and no distress  MENTAL STATUS: alert  AIRWAY EXAM: Mallampatti Class I (visualization of the soft palate, fauces, uvula, anterior and posterior pillars)  RESP: lungs clear to auscultation - no rales, rhonchi or wheezes  CV: regular rates and rhythm, normal S1 S2, no S3 or S4, no murmur, click or rub and no irregular beats      DIAGNOSTICS:    Not indicated      IMPRESSION   ASA Class 2 - Mild systemic disease        PLAN:       Plan for colonoscopy. We discussed the risks, benefits and alternatives and the patient wished to proceed.    The above has been forwarded to the consulting provider.      Signed Electronically by: ELIZABETH MCCAULEY MD  May 31, 2023    "

## 2023-10-18 ENCOUNTER — TRANSFERRED RECORDS (OUTPATIENT)
Dept: MULTI SPECIALTY CLINIC | Facility: CLINIC | Age: 68
End: 2023-10-18

## 2023-10-18 LAB — RETINOPATHY: NORMAL

## 2024-02-26 ENCOUNTER — OFFICE VISIT (OUTPATIENT)
Dept: INTERNAL MEDICINE | Facility: CLINIC | Age: 69
End: 2024-02-26
Payer: COMMERCIAL

## 2024-02-26 VITALS
DIASTOLIC BLOOD PRESSURE: 86 MMHG | BODY MASS INDEX: 27.67 KG/M2 | WEIGHT: 182 LBS | SYSTOLIC BLOOD PRESSURE: 144 MMHG | HEART RATE: 71 BPM | TEMPERATURE: 99.2 F | RESPIRATION RATE: 14 BRPM | OXYGEN SATURATION: 99 %

## 2024-02-26 DIAGNOSIS — E78.5 HYPERLIPIDEMIA LDL GOAL <130: ICD-10-CM

## 2024-02-26 DIAGNOSIS — Z29.11 NEED FOR VACCINATION AGAINST RESPIRATORY SYNCYTIAL VIRUS: ICD-10-CM

## 2024-02-26 DIAGNOSIS — I10 HYPERTENSION GOAL BP (BLOOD PRESSURE) < 140/90: ICD-10-CM

## 2024-02-26 DIAGNOSIS — Z00.00 WELLNESS EXAMINATION: Primary | ICD-10-CM

## 2024-02-26 DIAGNOSIS — Z12.5 SCREENING PSA (PROSTATE SPECIFIC ANTIGEN): ICD-10-CM

## 2024-02-26 DIAGNOSIS — E03.9 ACQUIRED HYPOTHYROIDISM: ICD-10-CM

## 2024-02-26 LAB
ANION GAP SERPL CALCULATED.3IONS-SCNC: 11 MMOL/L (ref 7–15)
BASOPHILS # BLD AUTO: 0.1 10E3/UL (ref 0–0.2)
BASOPHILS NFR BLD AUTO: 1 %
BUN SERPL-MCNC: 16.1 MG/DL (ref 8–23)
CALCIUM SERPL-MCNC: 10 MG/DL (ref 8.8–10.2)
CHLORIDE SERPL-SCNC: 98 MMOL/L (ref 98–107)
CHOLEST SERPL-MCNC: 196 MG/DL
CREAT SERPL-MCNC: 0.91 MG/DL (ref 0.67–1.17)
DEPRECATED HCO3 PLAS-SCNC: 28 MMOL/L (ref 22–29)
EGFRCR SERPLBLD CKD-EPI 2021: >90 ML/MIN/1.73M2
EOSINOPHIL # BLD AUTO: 0.2 10E3/UL (ref 0–0.7)
EOSINOPHIL NFR BLD AUTO: 3 %
ERYTHROCYTE [DISTWIDTH] IN BLOOD BY AUTOMATED COUNT: 12.7 % (ref 10–15)
FASTING STATUS PATIENT QL REPORTED: YES
GLUCOSE SERPL-MCNC: 89 MG/DL (ref 70–99)
HCT VFR BLD AUTO: 45.4 % (ref 40–53)
HDLC SERPL-MCNC: 59 MG/DL
HGB BLD-MCNC: 15.8 G/DL (ref 13.3–17.7)
IMM GRANULOCYTES # BLD: 0 10E3/UL
IMM GRANULOCYTES NFR BLD: 0 %
LDLC SERPL CALC-MCNC: 129 MG/DL
LYMPHOCYTES # BLD AUTO: 2.2 10E3/UL (ref 0.8–5.3)
LYMPHOCYTES NFR BLD AUTO: 31 %
MCH RBC QN AUTO: 31.2 PG (ref 26.5–33)
MCHC RBC AUTO-ENTMCNC: 34.8 G/DL (ref 31.5–36.5)
MCV RBC AUTO: 90 FL (ref 78–100)
MONOCYTES # BLD AUTO: 0.6 10E3/UL (ref 0–1.3)
MONOCYTES NFR BLD AUTO: 8 %
NEUTROPHILS # BLD AUTO: 4 10E3/UL (ref 1.6–8.3)
NEUTROPHILS NFR BLD AUTO: 56 %
NONHDLC SERPL-MCNC: 137 MG/DL
PLATELET # BLD AUTO: 227 10E3/UL (ref 150–450)
POTASSIUM SERPL-SCNC: 4.9 MMOL/L (ref 3.4–5.3)
PSA SERPL DL<=0.01 NG/ML-MCNC: 1.67 NG/ML (ref 0–4.5)
RBC # BLD AUTO: 5.07 10E6/UL (ref 4.4–5.9)
SODIUM SERPL-SCNC: 137 MMOL/L (ref 135–145)
T4 FREE SERPL-MCNC: 1.5 NG/DL (ref 0.9–1.7)
TRIGL SERPL-MCNC: 42 MG/DL
TSH SERPL DL<=0.005 MIU/L-ACNC: 4.55 UIU/ML (ref 0.3–4.2)
WBC # BLD AUTO: 7.1 10E3/UL (ref 4–11)

## 2024-02-26 PROCEDURE — 84443 ASSAY THYROID STIM HORMONE: CPT | Performed by: INTERNAL MEDICINE

## 2024-02-26 PROCEDURE — 84439 ASSAY OF FREE THYROXINE: CPT | Performed by: INTERNAL MEDICINE

## 2024-02-26 PROCEDURE — 85025 COMPLETE CBC W/AUTO DIFF WBC: CPT | Performed by: INTERNAL MEDICINE

## 2024-02-26 PROCEDURE — 80061 LIPID PANEL: CPT | Performed by: INTERNAL MEDICINE

## 2024-02-26 PROCEDURE — 80048 BASIC METABOLIC PNL TOTAL CA: CPT | Performed by: INTERNAL MEDICINE

## 2024-02-26 PROCEDURE — G0438 PPPS, INITIAL VISIT: HCPCS | Performed by: INTERNAL MEDICINE

## 2024-02-26 PROCEDURE — 99214 OFFICE O/P EST MOD 30 MIN: CPT | Mod: 25 | Performed by: INTERNAL MEDICINE

## 2024-02-26 PROCEDURE — 36415 COLL VENOUS BLD VENIPUNCTURE: CPT | Performed by: INTERNAL MEDICINE

## 2024-02-26 PROCEDURE — G0103 PSA SCREENING: HCPCS | Performed by: INTERNAL MEDICINE

## 2024-02-26 RX ORDER — PRAVASTATIN SODIUM 20 MG
TABLET ORAL
Qty: 90 TABLET | Refills: 3 | Status: SHIPPED | OUTPATIENT
Start: 2024-02-26

## 2024-02-26 RX ORDER — METOPROLOL TARTRATE 100 MG
50 TABLET ORAL 2 TIMES DAILY
Qty: 90 TABLET | Refills: 3 | Status: SHIPPED | OUTPATIENT
Start: 2024-02-26

## 2024-02-26 RX ORDER — LEVOTHYROXINE SODIUM 75 UG/1
TABLET ORAL
Qty: 90 TABLET | Refills: 3 | Status: SHIPPED | OUTPATIENT
Start: 2024-02-26

## 2024-02-26 RX ORDER — RESPIRATORY SYNCYTIAL VIRUS VACCINE 120MCG/0.5
0.5 KIT INTRAMUSCULAR ONCE
Qty: 1 EACH | Refills: 0 | Status: CANCELLED | OUTPATIENT
Start: 2024-02-26 | End: 2024-02-26

## 2024-02-26 SDOH — HEALTH STABILITY: PHYSICAL HEALTH: ON AVERAGE, HOW MANY MINUTES DO YOU ENGAGE IN EXERCISE AT THIS LEVEL?: 60 MIN

## 2024-02-26 SDOH — HEALTH STABILITY: PHYSICAL HEALTH: ON AVERAGE, HOW MANY DAYS PER WEEK DO YOU ENGAGE IN MODERATE TO STRENUOUS EXERCISE (LIKE A BRISK WALK)?: 3 DAYS

## 2024-02-26 ASSESSMENT — SOCIAL DETERMINANTS OF HEALTH (SDOH): HOW OFTEN DO YOU GET TOGETHER WITH FRIENDS OR RELATIVES?: TWICE A WEEK

## 2024-02-26 NOTE — LETTER
February 28, 2024    Sonny Armstrong  [address] [address]          Dear ,    We are writing to inform you of your test results.  All of these tests are within acceptable limits , things look good !       Resulted Orders   PSA, screen   Result Value Ref Range    Prostate Specific Antigen Screen 1.67 0.00 - 4.50 ng/mL    Narrative    This result is obtained using the Roche Elecsys total PSA method on the alondra e801 immunoassay analyzer. Results obtained with different assay methods or kits cannot be used interchangeably.   Lipid panel reflex to direct LDL Fasting   Result Value Ref Range    Cholesterol 196 <200 mg/dL    Triglycerides 42 <150 mg/dL    Direct Measure HDL 59 >=40 mg/dL    LDL Cholesterol Calculated 129 (H) <=100 mg/dL    Non HDL Cholesterol 137 (H) <130 mg/dL    Patient Fasting > 8hrs? Yes     Narrative    Cholesterol  Desirable:  <200 mg/dL    Triglycerides  Normal:  Less than 150 mg/dL  Borderline High:  150-199 mg/dL  High:  200-499 mg/dL  Very High:  Greater than or equal to 500 mg/dL    Direct Measure HDL  Female:  Greater than or equal to 50 mg/dL   Male:  Greater than or equal to 40 mg/dL    LDL Cholesterol  Desirable:  <100mg/dL  Above Desirable:  100-129 mg/dL   Borderline High:  130-159 mg/dL   High:  160-189 mg/dL   Very High:  >= 190 mg/dL    Non HDL Cholesterol  Desirable:  130 mg/dL  Above Desirable:  130-159 mg/dL  Borderline High:  160-189 mg/dL  High:  190-219 mg/dL  Very High:  Greater than or equal to 220 mg/dL   Basic metabolic panel  (Ca, Cl, CO2, Creat, Gluc, K, Na, BUN)   Result Value Ref Range    Sodium 137 135 - 145 mmol/L      Comment:      Reference intervals for this test were updated on 09/26/2023 to more accurately reflect our healthy population. There may be differences in the flagging of prior results with similar values performed with this method. Interpretation of those prior results can be made in the context of the updated  reference intervals.     Potassium 4.9 3.4 - 5.3 mmol/L    Chloride 98 98 - 107 mmol/L    Carbon Dioxide (CO2) 28 22 - 29 mmol/L    Anion Gap 11 7 - 15 mmol/L    Urea Nitrogen 16.1 8.0 - 23.0 mg/dL    Creatinine 0.91 0.67 - 1.17 mg/dL    GFR Estimate >90 >60 mL/min/1.73m2    Calcium 10.0 8.8 - 10.2 mg/dL    Glucose 89 70 - 99 mg/dL   TSH with free T4 reflex   Result Value Ref Range    TSH 4.55 (H) 0.30 - 4.20 uIU/mL   CBC with platelets and differential   Result Value Ref Range    WBC Count 7.1 4.0 - 11.0 10e3/uL    RBC Count 5.07 4.40 - 5.90 10e6/uL    Hemoglobin 15.8 13.3 - 17.7 g/dL    Hematocrit 45.4 40.0 - 53.0 %    MCV 90 78 - 100 fL    MCH 31.2 26.5 - 33.0 pg    MCHC 34.8 31.5 - 36.5 g/dL    RDW 12.7 10.0 - 15.0 %    Platelet Count 227 150 - 450 10e3/uL    % Neutrophils 56 %    % Lymphocytes 31 %    % Monocytes 8 %    % Eosinophils 3 %    % Basophils 1 %    % Immature Granulocytes 0 %    Absolute Neutrophils 4.0 1.6 - 8.3 10e3/uL    Absolute Lymphocytes 2.2 0.8 - 5.3 10e3/uL    Absolute Monocytes 0.6 0.0 - 1.3 10e3/uL    Absolute Eosinophils 0.2 0.0 - 0.7 10e3/uL    Absolute Basophils 0.1 0.0 - 0.2 10e3/uL    Absolute Immature Granulocytes 0.0 <=0.4 10e3/uL   T4 free   Result Value Ref Range    Free T4 1.50 0.90 - 1.70 ng/dL       If you have any questions or concerns, please call the clinic at the number listed above.       Sincerely,      Moreno Padilla MD

## 2024-02-26 NOTE — PROGRESS NOTES
"Preventive Care Visit  [facility] EVER Padilla MD, Internal Medicine  Feb 26, 2024    Assessment & Plan     Wellness examination  Routine screening issues , see orders section of this encounter   - REVIEW OF HEALTH MAINTENANCE PROTOCOL ORDERS  - CBC with platelets and differential; Future  - CBC with platelets and differential    Hyperlipidemia LDL goal <130  Problem is stable and ongoing monitoring    - REVIEW OF HEALTH MAINTENANCE PROTOCOL ORDERS  - pravastatin (PRAVACHOL) 20 MG tablet; TAKE 1 TABLET BY MOUTH EVERY DAY  - Lipid panel reflex to direct LDL Fasting; Future  - Lipid panel reflex to direct LDL Fasting    Acquired hypothyroidism  Problem is stable and ongoing monitoring    - REVIEW OF HEALTH MAINTENANCE PROTOCOL ORDERS  - levothyroxine (SYNTHROID/LEVOTHROID) 75 MCG tablet; TAKE 1 TABLET BY MOUTH DAILY  - TSH with free T4 reflex; Future  - TSH with free T4 reflex    Hypertension goal BP (blood pressure) < 140/90  Problem is stable and ongoing monitoring    - REVIEW OF HEALTH MAINTENANCE PROTOCOL ORDERS  - metoprolol tartrate (LOPRESSOR) 100 MG tablet; Take 0.5 tablets (50 mg) by mouth 2 times daily TAKE 1/2 TABLET BY MOUTH TWICE DAILY  - Basic metabolic panel  (Ca, Cl, CO2, Creat, Gluc, K, Na, BUN); Future  - Basic metabolic panel  (Ca, Cl, CO2, Creat, Gluc, K, Na, BUN)    Screening PSA (prostate specific antigen)  Routine screening issue  - PSA, screen; Future  - PSA, screen    Need for vaccination against respiratory syncytial virus  Recommended , declined  - REVIEW OF HEALTH MAINTENANCE PROTOCOL ORDERS        BMI  Estimated body mass index is 27.67 kg/m  as calculated from the following:    Height as of 3/27/23: 1.727 m (5' 8\").    Weight as of this encounter: 82.6 kg (182 lb).   Weight management plan: Discussed healthy diet and exercise guidelines    Counseling  Appropriate preventive services were discussed with this patient, including applicable screening as appropriate for " fall prevention, nutrition, physical activity, Tobacco-use cessation, weight loss and cognition.  Checklist reviewing preventive services available has been given to the patient.  Reviewed patient's diet, addressing concerns and/or questions.   He is at risk for lack of exercise and has been provided with information to increase physical activity for the benefit of his well-being.       Mick Dennis is a 68 year old, presenting for the following:  Wellness Visit        2/26/2024     2:00 PM   Additional Questions   Roomed by ernie   Accompanied by spouse         Via the Health Maintenance questionnaire, the patient has reported the following services have been completed -Eye Exam, this information has been sent to the abstraction team.  Health Care Directive  Patient does not have a Health Care Directive or Living Will: Discussed advance care planning with patient; however, patient declined at this time.    HPI          2/26/2024   General Health   How would you rate your overall physical health? Good   Feel stress (tense, anxious, or unable to sleep) Not at all         2/26/2024   Nutrition   Diet: Regular (no restrictions)         2/26/2024   Exercise   Days per week of moderate/strenous exercise 3 days   Average minutes spent exercising at this level 60 min         2/26/2024   Social Factors   Frequency of gathering with friends or relatives Twice a week   Worry food won't last until get money to buy more No   Food not last or not have enough money for food? No   Do you have housing?  Yes   Are you worried about losing your housing? No   Lack of transportation? No   Unable to get utilities (heat,electricity)? No         2/26/2024   Fall Risk   Fallen 2 or more times in the past year? No   Trouble with walking or balance? No          2/26/2024   Activities of Daily Living- Home Safety   Needs help with the following daily activites None of the above   Safety concerns in the home None of the above          2/26/2024   Dental   Dentist two times every year? Yes         2/26/2024   Hearing Screening   Hearing concerns? None of the above         2/26/2024   Driving Risk Screening   Patient/family members have concerns about driving No         2/26/2024   General Alertness/Fatigue Screening   Have you been more tired than usual lately? No         2/26/2024   Urinary Incontinence Screening   Bothered by leaking urine in past 6 months No          Today's PHQ-2 Score:       2/26/2024     1:53 PM   PHQ-2 ( 1999 [employer])   Q1: Little interest or pleasure in doing things 0   Q2: Feeling down, depressed or hopeless 0   PHQ-2 Score 0   Q1: Little interest or pleasure in doing things Not at all   Q2: Feeling down, depressed or hopeless Not at all   PHQ-2 Score 0           2/26/2024   Substance Use   Alcohol more than 3/day or more than 7/wk No   Do you have a current opioid prescription? No   How severe/bad is pain from 1 to 10? 0/10 (No Pain)   Do you use any other substances recreationally? No     Social History     Tobacco Use    Smoking status: Never    Smokeless tobacco: Never   Substance Use Topics    Alcohol use: No    Drug use: No           2/26/2024   AAA Screening   Family history of Abdominal Aortic Aneurysm (AAA)? No   Last PSA:   PSA   Date Value Ref Range Status   01/27/2020 0.93 0 - 4 ug/L Final     Comment:     Assay Method:  Chemiluminescence using Siemens Vista analyzer     ASCVD Risk   The 10-year ASCVD risk score (Joseline GARCIA, et al., 2019) is: 10.2%    Values used to calculate the score:      Age: 68 years      Sex: Male      Is Non- : No      Diabetic: No      Tobacco smoker: No      Systolic Blood Pressure: 106 mmHg      Is BP treated: Yes      HDL Cholesterol: 78 mg/dL      Total Cholesterol: 210 mg/dL        BP Readings from Last 6 Encounters:   02/26/24 (!) 171/78   05/31/23 106/71   03/27/23 120/68   05/19/22 120/64   04/28/22 (!) 162/74   03/25/21 138/88              Reviewed and updated as needed this visit by Provider                    Past Medical History:   Diagnosis Date    Hyperlipidemia LDL goal <130 11/16/2010    Hypertension goal BP (blood pressure) < 140/90 11/16/2010    Hypothyroidism 11/16/2010    BURDICK (nonalcoholic steatohepatitis) 2007    had liver biopsy     Past Surgical History:   Procedure Laterality Date    COLONOSCOPY WITH CO2 INSUFFLATION N/A 11/15/2018    Procedure: COLONOSCOPY WITH CO2 INSUFFLATION;  Surgeon: Timi Ballard MD;  Location: [facility] OR    COLONOSCOPY WITH CO2 INSUFFLATION N/A 5/31/2023    Procedure: Colonoscopy with CO2 insufflation;  Surgeon: Vineet Crowder MD;  Location: [facility] OR    TONSILLECTOMY  1971     Lab work is in process  Labs reviewed in EPIC  BP Readings from Last 3 Encounters:   02/26/24 (!) 144/86   05/31/23 106/71   03/27/23 120/68    Wt Readings from Last 3 Encounters:   02/26/24 82.6 kg (182 lb)   03/27/23 82.6 kg (182 lb)   04/28/22 80.7 kg (178 lb)                  Patient Active Problem List   Diagnosis    Hyperlipidemia LDL goal <130    Hypertension goal BP (blood pressure) < 140/90    BURDICK (nonalcoholic steatohepatitis)    Elevated glucose    Chronic fatigue    Advanced directives, counseling/discussion    Hx of LASIK, ou    Anterior corneal dystrophy, ou    Acquired hypothyroidism     Past Surgical History:   Procedure Laterality Date    COLONOSCOPY WITH CO2 INSUFFLATION N/A 11/15/2018    Procedure: COLONOSCOPY WITH CO2 INSUFFLATION;  Surgeon: Timi Ballard MD;  Location: [facility] OR    COLONOSCOPY WITH CO2 INSUFFLATION N/A 5/31/2023    Procedure: Colonoscopy with CO2 insufflation;  Surgeon: Vineet Crowder MD;  Location: [facility] OR    TONSILLECTOMY  1971       Social History     Tobacco Use    Smoking status: Never    Smokeless tobacco: Never   Substance Use Topics    Alcohol use: No     Family History   Problem Relation Age of Onset    Heart Disease Father     Diabetes Maternal Grandfather      Thyroid Disease Daughter          Current Outpatient Medications   Medication Sig Dispense Refill    aspirin 81 MG tablet Take 1 tablet by mouth daily      fish oil-omega-3 fatty acids 1000 MG capsule Take 3 capsules by mouth daily.      levothyroxine (SYNTHROID/LEVOTHROID) 75 MCG tablet TAKE 1 TABLET BY MOUTH DAILY 90 tablet 3    metoprolol tartrate (LOPRESSOR) 100 MG tablet Take 0.5 tablets (50 mg) by mouth 2 times daily TAKE 1/2 TABLET BY MOUTH TWICE DAILY 90 tablet 3    olopatadine (PATANOL) 0.1 % ophthalmic solution INSTILL 1 DROP IN BOTH EYES TWICE DAILY 5 mL 3    pravastatin (PRAVACHOL) 20 MG tablet TAKE 1 TABLET BY MOUTH EVERY DAY 90 tablet 3     No Known Allergies  Recent Labs   Lab Test 03/27/23  1810 04/28/22  1613 03/25/21  1633 01/27/20  1521 01/07/19  1515 02/23/18  1448   A1C  --   --  5.0  --  5.2 5.1   * 118* 117* 168* 146* 123*   HDL 78 66 65 40 48 57   TRIG 52 44 62 151* 121 100   ALT  --   --  39  --  49  --    CR 0.89 0.81 0.85 0.88 1.00 0.94   GFRESTIMATED >90 >90 >90 >90 81 82   GFRESTBLACK  --   --  >90 >90 >90 >90   POTASSIUM 4.7 4.2 4.0 4.4 5.1 4.5   TSH 3.49 3.35 6.44* 8.98* 4.21* 4.04*      Current providers sharing in care for this patient include:  Patient Care Team:  Moreno Padilla MD as PCP - General (Internal Medicine)  Moreno Padilla MD as Assigned PCP    The following health maintenance items are reviewed in Epic and correct as of today:  Health Maintenance   Topic Date Due    RSV VACCINE (Pregnancy & 60+) (1 - 1-dose 60+ series) Never done    MEDICARE ANNUAL WELLNESS VISIT  11/04/2021    Pneumococcal Vaccine: 65+ Years (1 of 1 - PCV) Never done    EYE EXAM  02/08/2023    ANNUAL REVIEW OF HM ORDERS  04/28/2023    INFLUENZA VACCINE (1) 09/01/2023    COVID-19 Vaccine (1 - 2023-24 season) Never done    LIPID  03/27/2024    TSH W/FREE T4 REFLEX  03/27/2024    ADVANCE CARE PLANNING  01/28/2025    FALL RISK ASSESSMENT  02/26/2025    GLUCOSE  03/27/2026    COLORECTAL CANCER  "SCREENING  05/31/2028    DTAP/TDAP/TD IMMUNIZATION (3 - Td or Tdap) 04/28/2032    HEPATITIS C SCREENING  Completed    PHQ-2 (once per calendar year)  Completed    ZOSTER IMMUNIZATION  Completed    IPV IMMUNIZATION  Aged Out    HPV IMMUNIZATION  Aged Out    MENINGITIS IMMUNIZATION  Aged Out    RSV MONOCLONAL ANTIBODY  Aged Out         Review of Systems  Constitutional, HEENT, cardiovascular, pulmonary, gi and gu systems are negative, except as otherwise noted.     Objective    Exam  BP (!) 144/86   Pulse 71   Temp 99.2  F (37.3  C) (Temporal)   Resp 14   Wt 82.6 kg (182 lb)   SpO2 99%   BMI 27.67 kg/m     Estimated body mass index is 27.67 kg/m  as calculated from the following:    Height as of 3/27/23: 1.727 m (5' 8\").    Weight as of 3/27/23: 82.6 kg (182 lb).    Physical Exam  GENERAL: alert and no distress  EYES: Eyes grossly normal to inspection, PERRL and conjunctivae and sclerae normal  HENT: ear canals and TM's normal, nose and mouth without ulcers or lesions  NECK: no adenopathy, no asymmetry, masses, or scars  RESP: lungs clear to auscultation - no rales, rhonchi or wheezes  CV: regular rate and rhythm, normal S1 S2, no S3 or S4, no murmur, click or rub, no peripheral edema  ABDOMEN: soft, nontender, no hepatosplenomegaly, no masses and bowel sounds normal  MS: no gross musculoskeletal defects noted, no edema  SKIN: no suspicious lesions or rashes  NEURO: Normal strength and tone, mentation intact and speech normal  PSYCH: mentation appears normal, affect normal/bright         No data to display              This patient recalled all words and maria a a normal clock. This patient has no evidence of cognitive dysfunction        Signed Electronically by: Moreno Padilla MD    "

## 2024-11-26 ENCOUNTER — TRANSFERRED RECORDS (OUTPATIENT)
Dept: MULTI SPECIALTY CLINIC | Facility: CLINIC | Age: 69
End: 2024-11-26

## 2024-11-26 LAB — RETINOPATHY: NORMAL

## 2025-01-27 ENCOUNTER — PATIENT OUTREACH (OUTPATIENT)
Dept: CARE COORDINATION | Facility: CLINIC | Age: 70
End: 2025-01-27
Payer: COMMERCIAL

## 2025-01-27 ENCOUNTER — TELEPHONE (OUTPATIENT)
Dept: FAMILY MEDICINE | Facility: CLINIC | Age: 70
End: 2025-01-27
Payer: COMMERCIAL

## 2025-01-27 NOTE — TELEPHONE ENCOUNTER
Patient Quality Outreach    Patient is due for the following:   Physical Preventive Adult Physical    Action(s) Taken:   Schedule a Annual Wellness Visit    Type of outreach:    Sent [employer] message.    Questions for provider review:    None           Cathleen Haskins CMA  Chart routed to none.

## 2025-02-10 ENCOUNTER — PATIENT OUTREACH (OUTPATIENT)
Dept: CARE COORDINATION | Facility: CLINIC | Age: 70
End: 2025-02-10
Payer: COMMERCIAL

## 2025-03-17 ENCOUNTER — TELEPHONE (OUTPATIENT)
Dept: INTERNAL MEDICINE | Facility: CLINIC | Age: 70
End: 2025-03-17
Payer: COMMERCIAL

## 2025-03-17 DIAGNOSIS — E78.5 HYPERLIPIDEMIA LDL GOAL <130: ICD-10-CM

## 2025-03-17 DIAGNOSIS — E03.9 ACQUIRED HYPOTHYROIDISM: ICD-10-CM

## 2025-03-17 DIAGNOSIS — I10 HYPERTENSION GOAL BP (BLOOD PRESSURE) < 140/90: ICD-10-CM

## 2025-03-17 RX ORDER — METOPROLOL TARTRATE 100 MG/1
TABLET ORAL
Qty: 90 TABLET | Refills: 0 | Status: SHIPPED | OUTPATIENT
Start: 2025-03-17

## 2025-03-17 RX ORDER — PRAVASTATIN SODIUM 20 MG
TABLET ORAL
Qty: 90 TABLET | Refills: 0 | Status: SHIPPED | OUTPATIENT
Start: 2025-03-17

## 2025-03-17 RX ORDER — LEVOTHYROXINE SODIUM 75 UG/1
TABLET ORAL
Qty: 90 TABLET | Refills: 0 | Status: SHIPPED | OUTPATIENT
Start: 2025-03-17

## 2025-03-19 NOTE — TELEPHONE ENCOUNTER
His last appointment with me was 2-    This is over a year ago. He need appointment with me. Any urgent refills to tide him over I am fine with.    Please check-in with patient and reroute for signing of any orders in the interim until his appointment with me    Moreno Padilla MD

## 2025-03-30 ENCOUNTER — HEALTH MAINTENANCE LETTER (OUTPATIENT)
Age: 70
End: 2025-03-30

## 2025-05-27 ENCOUNTER — RESULTS FOLLOW-UP (OUTPATIENT)
Dept: FAMILY MEDICINE | Facility: CLINIC | Age: 70
End: 2025-05-27

## 2025-08-25 ENCOUNTER — PATIENT OUTREACH (OUTPATIENT)
Dept: CARE COORDINATION | Facility: CLINIC | Age: 70
End: 2025-08-25
Payer: COMMERCIAL

## (undated) DEVICE — SWAB PROCTO 16" NON-STERILE

## (undated) DEVICE — ENDO CATH ESOPH/PYL/COLONIC BALLOON 18-19-20MMX240CM CRE

## (undated) DEVICE — SPECIMEN TRAP VACUUM SUCTION 003984-901

## (undated) DEVICE — SYR 50ML CATH TIP W/O NDL 309620

## (undated) DEVICE — ENDO CATH ESOPH BALLOON 12-13.5-15MMX180CM CRE FIXED WIRE

## (undated) DEVICE — ENDO CATH ESOPH/PYL/COLONIC BALLOON 12-13.5-15MMX240CM CRE

## (undated) DEVICE — ENDO CATH ESOPH BALLOON 10-11-12MMX180CM CRE FIXED WIRE

## (undated) DEVICE — ENDO FORCEP ENDOJAW BIOPSY 2.0MMX155CM FB-221K

## (undated) DEVICE — ESU ERBE FIAPC PROBE 2.3MMX220CM

## (undated) DEVICE — ENDO SNARE OVAL 2.5X4.0CM W/25GA NDL

## (undated) DEVICE — ENDO CATH ESOPH/PYL/COLONIC BALLOON 08-9-10MMX240CM CRE

## (undated) DEVICE — SYR INFLATOR AND GAUGE 60ML M00550601

## (undated) DEVICE — CUP DENTURE 7.5OZ GOLD DISP

## (undated) DEVICE — SUCTION CANISTER MEDIVAC LINER 1500ML W/LID 65651-515

## (undated) DEVICE — ENDO CATH ESOPH/PYL/COLONIC BALLOON 10-11-12MMX240CM CRE

## (undated) DEVICE — ENDO CATH ESOPH BALLOON 15-16.5-18MMX180CM CRE FIXED WIRE

## (undated) DEVICE — SUCTION TIP YANKAUER W/O VENT K86

## (undated) DEVICE — ENDO CATH ESOPH BALLOON 06-7-8MMX180CM CRE FIXED WIRE

## (undated) DEVICE — ENDO FORCEP ENDOJAW BIOPSY 2.8MMX230CM FB-220U

## (undated) DEVICE — ENDO CATH ESOPH/PYL/COLONIC BALLOON 15-16.5-18MMX240CM CRE

## (undated) DEVICE — SOL WATER IRRIG 1000ML BOTTLE 07139-09

## (undated) DEVICE — DEVICE RETRIEVAL ROTH NET PLATINUM UNIV 2.5MMX230CM 00715050

## (undated) DEVICE — Device

## (undated) DEVICE — ENDO CATH ESOPH BALLOON 18-19-20MMX180CM CRE FIXED WIRE

## (undated) DEVICE — SPECIMEN TRAP 80ML BUSSE BW407

## (undated) DEVICE — ENDO CATH ESOPH BALLOON 08-9-10MMX180CM CRE FIXED WIRE

## (undated) DEVICE — SYR 50ML SLIP TIP W/O NDL 309654

## (undated) DEVICE — ENDO MARKER SPOT 5ML

## (undated) DEVICE — SPECIMEN CONTAINER 60MLW/10% FORMALIN 59601

## (undated) RX ORDER — FENTANYL CITRATE 50 UG/ML
INJECTION, SOLUTION INTRAMUSCULAR; INTRAVENOUS
Status: DISPENSED
Start: 2023-05-31

## (undated) RX ORDER — FENTANYL CITRATE 50 UG/ML
INJECTION, SOLUTION INTRAMUSCULAR; INTRAVENOUS
Status: DISPENSED
Start: 2018-11-15

## (undated) RX ORDER — SIMETHICONE 40MG/0.6ML
SUSPENSION, DROPS(FINAL DOSAGE FORM)(ML) ORAL
Status: DISPENSED
Start: 2018-11-15